# Patient Record
Sex: FEMALE | Race: WHITE | NOT HISPANIC OR LATINO | URBAN - METROPOLITAN AREA
[De-identification: names, ages, dates, MRNs, and addresses within clinical notes are randomized per-mention and may not be internally consistent; named-entity substitution may affect disease eponyms.]

---

## 2016-12-13 RX ORDER — FLUOXETINE HCL 10 MG
1 CAPSULE ORAL
Qty: 30 | Refills: 0 | COMMUNITY
Start: 2016-12-13 | End: 2017-01-12

## 2017-01-03 ENCOUNTER — OUTPATIENT (OUTPATIENT)
Dept: OUTPATIENT SERVICES | Facility: HOSPITAL | Age: 72
LOS: 1 days | End: 2017-01-03
Payer: SELF-PAY

## 2017-01-03 ENCOUNTER — APPOINTMENT (OUTPATIENT)
Age: 72
End: 2017-01-03

## 2017-01-03 VITALS
BODY MASS INDEX: 23.86 KG/M2 | WEIGHT: 152 LBS | DIASTOLIC BLOOD PRESSURE: 80 MMHG | HEIGHT: 67 IN | SYSTOLIC BLOOD PRESSURE: 126 MMHG

## 2017-01-03 DIAGNOSIS — R41.82 ALTERED MENTAL STATUS, UNSPECIFIED: ICD-10-CM

## 2017-01-03 DIAGNOSIS — I10 ESSENTIAL (PRIMARY) HYPERTENSION: ICD-10-CM

## 2017-01-03 DIAGNOSIS — I48.91 UNSPECIFIED ATRIAL FIBRILLATION: ICD-10-CM

## 2017-01-03 LAB
INR PPP: 2 RATIO
POCT-PROTHROMBIN TIME: 24.6 SECS

## 2017-01-03 PROCEDURE — G0463: CPT

## 2017-01-04 ENCOUNTER — NON-APPOINTMENT (OUTPATIENT)
Age: 72
End: 2017-01-04

## 2017-01-04 ENCOUNTER — APPOINTMENT (OUTPATIENT)
Dept: CARDIOLOGY | Facility: HOSPITAL | Age: 72
End: 2017-01-04

## 2017-01-04 ENCOUNTER — OUTPATIENT (OUTPATIENT)
Dept: OUTPATIENT SERVICES | Facility: HOSPITAL | Age: 72
LOS: 1 days | End: 2017-01-04
Payer: SELF-PAY

## 2017-01-04 VITALS
SYSTOLIC BLOOD PRESSURE: 92 MMHG | OXYGEN SATURATION: 96 % | WEIGHT: 151 LBS | HEART RATE: 107 BPM | HEIGHT: 67 IN | BODY MASS INDEX: 23.7 KG/M2 | DIASTOLIC BLOOD PRESSURE: 62 MMHG

## 2017-01-04 DIAGNOSIS — I63.9 CEREBRAL INFARCTION, UNSPECIFIED: ICD-10-CM

## 2017-01-04 DIAGNOSIS — G89.29 OTHER CHRONIC PAIN: ICD-10-CM

## 2017-01-04 DIAGNOSIS — I25.10 ATHEROSCLEROTIC HEART DISEASE OF NATIVE CORONARY ARTERY WITHOUT ANGINA PECTORIS: ICD-10-CM

## 2017-01-04 DIAGNOSIS — M54.6 PAIN IN THORACIC SPINE: ICD-10-CM

## 2017-01-04 DIAGNOSIS — R05 COUGH: ICD-10-CM

## 2017-01-04 PROCEDURE — G0463: CPT

## 2017-01-05 ENCOUNTER — APPOINTMENT (OUTPATIENT)
Dept: INTERNAL MEDICINE | Facility: CLINIC | Age: 72
End: 2017-01-05

## 2017-01-05 DIAGNOSIS — I48.91 UNSPECIFIED ATRIAL FIBRILLATION: ICD-10-CM

## 2017-01-10 ENCOUNTER — LABORATORY RESULT (OUTPATIENT)
Age: 72
End: 2017-01-10

## 2017-01-11 ENCOUNTER — APPOINTMENT (OUTPATIENT)
Dept: INTERNAL MEDICINE | Facility: CLINIC | Age: 72
End: 2017-01-11

## 2017-01-11 ENCOUNTER — OUTPATIENT (OUTPATIENT)
Dept: OUTPATIENT SERVICES | Facility: HOSPITAL | Age: 72
LOS: 1 days | End: 2017-01-11
Payer: SELF-PAY

## 2017-01-11 VITALS
HEART RATE: 106 BPM | DIASTOLIC BLOOD PRESSURE: 70 MMHG | HEIGHT: 67 IN | OXYGEN SATURATION: 98 % | SYSTOLIC BLOOD PRESSURE: 100 MMHG

## 2017-01-11 DIAGNOSIS — I63.9 CEREBRAL INFARCTION, UNSPECIFIED: ICD-10-CM

## 2017-01-11 DIAGNOSIS — M54.6 PAIN IN THORACIC SPINE: ICD-10-CM

## 2017-01-11 DIAGNOSIS — I10 ESSENTIAL (PRIMARY) HYPERTENSION: ICD-10-CM

## 2017-01-11 DIAGNOSIS — R05 COUGH: ICD-10-CM

## 2017-01-11 DIAGNOSIS — G89.29 OTHER CHRONIC PAIN: ICD-10-CM

## 2017-01-11 LAB
INR PPP: 4.2 RATIO
POCT-PROTHROMBIN TIME: 49.8 SECS
QUALITY CONTROL: YES

## 2017-01-11 PROCEDURE — G0463: CPT

## 2017-01-11 PROCEDURE — 85610 PROTHROMBIN TIME: CPT

## 2017-01-12 DIAGNOSIS — I48.91 UNSPECIFIED ATRIAL FIBRILLATION: ICD-10-CM

## 2017-01-13 LAB
ALBUMIN SERPL ELPH-MCNC: 4.1 G/DL
ALP BLD-CCNC: 73 U/L
ALT SERPL-CCNC: 21 U/L
ANION GAP SERPL CALC-SCNC: 13 MMOL/L
AST SERPL-CCNC: 17 U/L
BASOPHILS # BLD AUTO: 0.03 K/UL
BASOPHILS NFR BLD AUTO: 0.4 %
BILIRUB SERPL-MCNC: 0.6 MG/DL
BUN SERPL-MCNC: 12 MG/DL
CALCIUM SERPL-MCNC: 9.1 MG/DL
CHLORIDE SERPL-SCNC: 104 MMOL/L
CO2 SERPL-SCNC: 25 MMOL/L
CREAT SERPL-MCNC: 0.58 MG/DL
EOSINOPHIL # BLD AUTO: 0.22 K/UL
EOSINOPHIL NFR BLD AUTO: 2.7 %
GLUCOSE SERPL-MCNC: 102 MG/DL
HCT VFR BLD CALC: 44.7 %
HGB BLD-MCNC: 14 G/DL
IMM GRANULOCYTES NFR BLD AUTO: 0.2 %
LYMPHOCYTES # BLD AUTO: 1.34 K/UL
LYMPHOCYTES NFR BLD AUTO: 16.2 %
MAN DIFF?: NORMAL
MCHC RBC-ENTMCNC: 28.1 PG
MCHC RBC-ENTMCNC: 31.3 GM/DL
MCV RBC AUTO: 89.6 FL
MONOCYTES # BLD AUTO: 0.63 K/UL
MONOCYTES NFR BLD AUTO: 7.6 %
NEUTROPHILS # BLD AUTO: 6.01 K/UL
NEUTROPHILS NFR BLD AUTO: 72.9 %
PLATELET # BLD AUTO: 313 K/UL
POTASSIUM SERPL-SCNC: 4.2 MMOL/L
PROT SERPL-MCNC: 6.9 G/DL
RBC # BLD: 4.99 M/UL
RBC # FLD: 14.2 %
SODIUM SERPL-SCNC: 142 MMOL/L
TSH SERPL-ACNC: 5.57 UIU/ML
WBC # FLD AUTO: 8.25 K/UL

## 2017-01-17 ENCOUNTER — APPOINTMENT (OUTPATIENT)
Dept: INTERNAL MEDICINE | Facility: CLINIC | Age: 72
End: 2017-01-17

## 2017-01-17 ENCOUNTER — RESULT CHARGE (OUTPATIENT)
Age: 72
End: 2017-01-17

## 2017-01-17 LAB
INR PPP: 2 RATIO
POCT-PROTHROMBIN TIME: 23.6 SECS
QUALITY CONTROL: YES

## 2017-01-19 ENCOUNTER — OUTPATIENT (OUTPATIENT)
Dept: OUTPATIENT SERVICES | Facility: HOSPITAL | Age: 72
LOS: 1 days | End: 2017-01-19
Payer: SELF-PAY

## 2017-01-19 ENCOUNTER — APPOINTMENT (OUTPATIENT)
Dept: CV DIAGNOSITCS | Facility: HOSPITAL | Age: 72
End: 2017-01-19

## 2017-01-19 VITALS
HEIGHT: 65 IN | RESPIRATION RATE: 16 BRPM | SYSTOLIC BLOOD PRESSURE: 131 MMHG | DIASTOLIC BLOOD PRESSURE: 96 MMHG | HEART RATE: 122 BPM | WEIGHT: 149.91 LBS | OXYGEN SATURATION: 95 % | TEMPERATURE: 98 F

## 2017-01-19 DIAGNOSIS — Z86.79 PERSONAL HISTORY OF OTHER DISEASES OF THE CIRCULATORY SYSTEM: Chronic | ICD-10-CM

## 2017-01-19 DIAGNOSIS — Z90.49 ACQUIRED ABSENCE OF OTHER SPECIFIED PARTS OF DIGESTIVE TRACT: Chronic | ICD-10-CM

## 2017-01-19 DIAGNOSIS — I48.91 UNSPECIFIED ATRIAL FIBRILLATION: ICD-10-CM

## 2017-01-19 LAB
ANION GAP SERPL CALC-SCNC: 14 MMOL/L — SIGNIFICANT CHANGE UP (ref 5–17)
APTT BLD: 37 SEC — SIGNIFICANT CHANGE UP (ref 27.5–37.4)
BUN SERPL-MCNC: 11 MG/DL — SIGNIFICANT CHANGE UP (ref 7–23)
CALCIUM SERPL-MCNC: 9.2 MG/DL — SIGNIFICANT CHANGE UP (ref 8.4–10.5)
CHLORIDE SERPL-SCNC: 101 MMOL/L — SIGNIFICANT CHANGE UP (ref 96–108)
CO2 SERPL-SCNC: 24 MMOL/L — SIGNIFICANT CHANGE UP (ref 22–31)
CREAT SERPL-MCNC: 0.62 MG/DL — SIGNIFICANT CHANGE UP (ref 0.5–1.3)
GLUCOSE SERPL-MCNC: 106 MG/DL — HIGH (ref 70–99)
HCT VFR BLD CALC: 43 % — SIGNIFICANT CHANGE UP (ref 34.5–45)
HGB BLD-MCNC: 14 G/DL — SIGNIFICANT CHANGE UP (ref 11.5–15.5)
INR BLD: 2 RATIO — HIGH (ref 0.88–1.16)
MCHC RBC-ENTMCNC: 28.6 PG — SIGNIFICANT CHANGE UP (ref 27–34)
MCHC RBC-ENTMCNC: 32.4 GM/DL — SIGNIFICANT CHANGE UP (ref 32–36)
MCV RBC AUTO: 88.1 FL — SIGNIFICANT CHANGE UP (ref 80–100)
PLATELET # BLD AUTO: 262 K/UL — SIGNIFICANT CHANGE UP (ref 150–400)
POTASSIUM SERPL-MCNC: 4.5 MMOL/L — SIGNIFICANT CHANGE UP (ref 3.5–5.3)
POTASSIUM SERPL-SCNC: 4.5 MMOL/L — SIGNIFICANT CHANGE UP (ref 3.5–5.3)
PROTHROM AB SERPL-ACNC: 22 SEC — HIGH (ref 10–13.1)
RBC # BLD: 4.89 M/UL — SIGNIFICANT CHANGE UP (ref 3.8–5.2)
RBC # FLD: 12.7 % — SIGNIFICANT CHANGE UP (ref 10.3–14.5)
SODIUM SERPL-SCNC: 139 MMOL/L — SIGNIFICANT CHANGE UP (ref 135–145)
WBC # BLD: 8.5 K/UL — SIGNIFICANT CHANGE UP (ref 3.8–10.5)
WBC # FLD AUTO: 8.5 K/UL — SIGNIFICANT CHANGE UP (ref 3.8–10.5)

## 2017-01-19 PROCEDURE — 93325 DOPPLER ECHO COLOR FLOW MAPG: CPT

## 2017-01-19 PROCEDURE — 93312 ECHO TRANSESOPHAGEAL: CPT

## 2017-01-19 PROCEDURE — 93010 ELECTROCARDIOGRAM REPORT: CPT | Mod: 77

## 2017-01-19 PROCEDURE — 85610 PROTHROMBIN TIME: CPT

## 2017-01-19 PROCEDURE — 93312 ECHO TRANSESOPHAGEAL: CPT | Mod: 26

## 2017-01-19 PROCEDURE — 93010 ELECTROCARDIOGRAM REPORT: CPT

## 2017-01-19 PROCEDURE — 93325 DOPPLER ECHO COLOR FLOW MAPG: CPT | Mod: 26

## 2017-01-19 PROCEDURE — 93320 DOPPLER ECHO COMPLETE: CPT

## 2017-01-19 PROCEDURE — 93320 DOPPLER ECHO COMPLETE: CPT | Mod: 26

## 2017-01-19 PROCEDURE — 93005 ELECTROCARDIOGRAM TRACING: CPT

## 2017-01-19 PROCEDURE — 85730 THROMBOPLASTIN TIME PARTIAL: CPT

## 2017-01-19 PROCEDURE — 80048 BASIC METABOLIC PNL TOTAL CA: CPT

## 2017-01-19 PROCEDURE — 85027 COMPLETE CBC AUTOMATED: CPT

## 2017-01-19 RX ORDER — SODIUM CHLORIDE 9 MG/ML
3 INJECTION INTRAMUSCULAR; INTRAVENOUS; SUBCUTANEOUS EVERY 8 HOURS
Qty: 0 | Refills: 0 | Status: DISCONTINUED | OUTPATIENT
Start: 2017-01-19 | End: 2017-02-03

## 2017-01-19 NOTE — H&P CARDIOLOGY - PMH
Afib    CVA (cerebral vascular accident)    HLD (hyperlipidemia)    Vision loss Afib    CVA (cerebral vascular accident)    HLD (hyperlipidemia)    PNA (pneumonia)    Vision loss

## 2017-01-19 NOTE — H&P CARDIOLOGY - HISTORY OF PRESENT ILLNESS
71 year old female with PMH of occipital CVA 11/2016  c/b right arm weakness, bilateral visual field deficits, HLD, A Fib and chronic midline thoracic back pain  presents today for RACIEL/cardioversion. 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 Patient reports she was found to be in A Fib when admitted for CVA in November. Treated with Warfarin, Amiodarione and CCB but still experiences palpitations associated with left chest discomfort (unrelated to activity). Evaluated by Dr Grey and cardioversion recommended. 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 Patient reports she was found to be in A Fib when admitted for CVA in November. Treated with Warfarin, Amiodarione and CCB but still experiences palpitations associated with left chest discomfort (unrelated to activity). Evaluated by Dr Grey and cardioversion recommended.     Patient (speaks good English) was  offered phone  but requested that her grandson translate as needed.

## 2017-01-24 ENCOUNTER — APPOINTMENT (OUTPATIENT)
Dept: INTERNAL MEDICINE | Facility: CLINIC | Age: 72
End: 2017-01-24

## 2017-01-24 ENCOUNTER — RESULT CHARGE (OUTPATIENT)
Age: 72
End: 2017-01-24

## 2017-01-24 LAB
INR PPP: 1.8 RATIO
POCT-PROTHROMBIN TIME: 21.8 SECS
QUALITY CONTROL: YES

## 2017-01-27 ENCOUNTER — APPOINTMENT (OUTPATIENT)
Dept: OPHTHALMOLOGY | Facility: CLINIC | Age: 72
End: 2017-01-27

## 2017-01-31 ENCOUNTER — OUTPATIENT (OUTPATIENT)
Dept: OUTPATIENT SERVICES | Facility: HOSPITAL | Age: 72
LOS: 1 days | End: 2017-01-31
Payer: SELF-PAY

## 2017-01-31 ENCOUNTER — APPOINTMENT (OUTPATIENT)
Dept: INTERNAL MEDICINE | Facility: CLINIC | Age: 72
End: 2017-01-31

## 2017-01-31 ENCOUNTER — NON-APPOINTMENT (OUTPATIENT)
Age: 72
End: 2017-01-31

## 2017-01-31 VITALS
HEIGHT: 67 IN | BODY MASS INDEX: 24.17 KG/M2 | HEART RATE: 68 BPM | SYSTOLIC BLOOD PRESSURE: 140 MMHG | DIASTOLIC BLOOD PRESSURE: 76 MMHG | WEIGHT: 154 LBS

## 2017-01-31 DIAGNOSIS — I10 ESSENTIAL (PRIMARY) HYPERTENSION: ICD-10-CM

## 2017-01-31 DIAGNOSIS — Z86.79 PERSONAL HISTORY OF OTHER DISEASES OF THE CIRCULATORY SYSTEM: Chronic | ICD-10-CM

## 2017-01-31 DIAGNOSIS — Z90.49 ACQUIRED ABSENCE OF OTHER SPECIFIED PARTS OF DIGESTIVE TRACT: Chronic | ICD-10-CM

## 2017-01-31 LAB
INR PPP: 1.6 RATIO
POCT-PROTHROMBIN TIME: 18.8 SECS
QUALITY CONTROL: YES

## 2017-01-31 PROCEDURE — 93005 ELECTROCARDIOGRAM TRACING: CPT

## 2017-01-31 PROCEDURE — G0463: CPT

## 2017-01-31 RX ORDER — WARFARIN 4 MG/1
4 TABLET ORAL DAILY
Qty: 14 | Refills: 0 | Status: DISCONTINUED | COMMUNITY
Start: 2017-01-03 | End: 2017-01-31

## 2017-02-01 ENCOUNTER — APPOINTMENT (OUTPATIENT)
Dept: OBGYN | Facility: CLINIC | Age: 72
End: 2017-02-01

## 2017-02-01 ENCOUNTER — APPOINTMENT (OUTPATIENT)
Dept: MAMMOGRAPHY | Facility: IMAGING CENTER | Age: 72
End: 2017-02-01

## 2017-02-01 DIAGNOSIS — R05 COUGH: ICD-10-CM

## 2017-02-01 DIAGNOSIS — I48.91 UNSPECIFIED ATRIAL FIBRILLATION: ICD-10-CM

## 2017-02-01 DIAGNOSIS — I63.9 CEREBRAL INFARCTION, UNSPECIFIED: ICD-10-CM

## 2017-02-01 DIAGNOSIS — Z00.00 ENCOUNTER FOR GENERAL ADULT MEDICAL EXAMINATION WITHOUT ABNORMAL FINDINGS: ICD-10-CM

## 2017-02-01 DIAGNOSIS — G89.29 OTHER CHRONIC PAIN: ICD-10-CM

## 2017-02-01 DIAGNOSIS — M54.6 PAIN IN THORACIC SPINE: ICD-10-CM

## 2017-02-03 ENCOUNTER — APPOINTMENT (OUTPATIENT)
Dept: OPHTHALMOLOGY | Facility: CLINIC | Age: 72
End: 2017-02-03

## 2017-02-07 ENCOUNTER — OUTPATIENT (OUTPATIENT)
Dept: OUTPATIENT SERVICES | Facility: HOSPITAL | Age: 72
LOS: 1 days | End: 2017-02-07
Payer: SELF-PAY

## 2017-02-07 ENCOUNTER — RESULT REVIEW (OUTPATIENT)
Age: 72
End: 2017-02-07

## 2017-02-07 ENCOUNTER — RESULT CHARGE (OUTPATIENT)
Age: 72
End: 2017-02-07

## 2017-02-07 ENCOUNTER — LABORATORY RESULT (OUTPATIENT)
Age: 72
End: 2017-02-07

## 2017-02-07 ENCOUNTER — APPOINTMENT (OUTPATIENT)
Dept: INTERNAL MEDICINE | Facility: CLINIC | Age: 72
End: 2017-02-07

## 2017-02-07 DIAGNOSIS — Z86.79 PERSONAL HISTORY OF OTHER DISEASES OF THE CIRCULATORY SYSTEM: Chronic | ICD-10-CM

## 2017-02-07 DIAGNOSIS — Z90.49 ACQUIRED ABSENCE OF OTHER SPECIFIED PARTS OF DIGESTIVE TRACT: Chronic | ICD-10-CM

## 2017-02-07 LAB
INR PPP: 1.6 RATIO
POCT-PROTHROMBIN TIME: 19.6 SECS
QUALITY CONTROL: YES

## 2017-02-07 PROCEDURE — 85610 PROTHROMBIN TIME: CPT

## 2017-02-08 ENCOUNTER — OUTPATIENT (OUTPATIENT)
Dept: OUTPATIENT SERVICES | Facility: HOSPITAL | Age: 72
LOS: 1 days | End: 2017-02-08
Payer: SELF-PAY

## 2017-02-08 ENCOUNTER — NON-APPOINTMENT (OUTPATIENT)
Age: 72
End: 2017-02-08

## 2017-02-08 ENCOUNTER — APPOINTMENT (OUTPATIENT)
Dept: CARDIOLOGY | Facility: HOSPITAL | Age: 72
End: 2017-02-08

## 2017-02-08 ENCOUNTER — APPOINTMENT (OUTPATIENT)
Dept: OBGYN | Facility: CLINIC | Age: 72
End: 2017-02-08

## 2017-02-08 VITALS
SYSTOLIC BLOOD PRESSURE: 120 MMHG | DIASTOLIC BLOOD PRESSURE: 86 MMHG | HEIGHT: 65 IN | OXYGEN SATURATION: 99 % | HEART RATE: 130 BPM

## 2017-02-08 VITALS — WEIGHT: 155 LBS | BODY MASS INDEX: 25.79 KG/M2 | DIASTOLIC BLOOD PRESSURE: 88 MMHG | SYSTOLIC BLOOD PRESSURE: 148 MMHG

## 2017-02-08 DIAGNOSIS — N76.0 ACUTE VAGINITIS: ICD-10-CM

## 2017-02-08 DIAGNOSIS — Z86.79 PERSONAL HISTORY OF OTHER DISEASES OF THE CIRCULATORY SYSTEM: ICD-10-CM

## 2017-02-08 DIAGNOSIS — Z90.49 ACQUIRED ABSENCE OF OTHER SPECIFIED PARTS OF DIGESTIVE TRACT: Chronic | ICD-10-CM

## 2017-02-08 DIAGNOSIS — Z86.79 PERSONAL HISTORY OF OTHER DISEASES OF THE CIRCULATORY SYSTEM: Chronic | ICD-10-CM

## 2017-02-08 DIAGNOSIS — R05 COUGH: ICD-10-CM

## 2017-02-08 DIAGNOSIS — Z00.00 ENCOUNTER FOR GENERAL ADULT MEDICAL EXAMINATION WITHOUT ABNORMAL FINDINGS: ICD-10-CM

## 2017-02-08 DIAGNOSIS — I48.91 UNSPECIFIED ATRIAL FIBRILLATION: ICD-10-CM

## 2017-02-08 DIAGNOSIS — G89.29 OTHER CHRONIC PAIN: ICD-10-CM

## 2017-02-08 DIAGNOSIS — I63.9 CEREBRAL INFARCTION, UNSPECIFIED: ICD-10-CM

## 2017-02-08 DIAGNOSIS — I25.10 ATHEROSCLEROTIC HEART DISEASE OF NATIVE CORONARY ARTERY WITHOUT ANGINA PECTORIS: ICD-10-CM

## 2017-02-08 DIAGNOSIS — Z79.01 LONG TERM (CURRENT) USE OF ANTICOAGULANTS: ICD-10-CM

## 2017-02-08 DIAGNOSIS — H35.30 UNSPECIFIED MACULAR DEGENERATION: ICD-10-CM

## 2017-02-08 DIAGNOSIS — M54.6 PAIN IN THORACIC SPINE: ICD-10-CM

## 2017-02-08 PROCEDURE — 93005 ELECTROCARDIOGRAM TRACING: CPT

## 2017-02-08 PROCEDURE — G0439: CPT

## 2017-02-08 PROCEDURE — G0463: CPT

## 2017-02-08 PROCEDURE — 87624 HPV HI-RISK TYP POOLED RSLT: CPT

## 2017-02-08 PROCEDURE — G0463: CPT | Mod: 25

## 2017-02-08 RX ORDER — DIGOXIN 125 UG/1
125 TABLET ORAL
Qty: 15 | Refills: 2 | Status: DISCONTINUED | COMMUNITY
Start: 2017-02-08 | End: 2017-02-08

## 2017-02-14 ENCOUNTER — APPOINTMENT (OUTPATIENT)
Dept: INTERNAL MEDICINE | Facility: CLINIC | Age: 72
End: 2017-02-14

## 2017-02-14 ENCOUNTER — RESULT CHARGE (OUTPATIENT)
Age: 72
End: 2017-02-14

## 2017-02-14 ENCOUNTER — OUTPATIENT (OUTPATIENT)
Dept: OUTPATIENT SERVICES | Facility: HOSPITAL | Age: 72
LOS: 1 days | End: 2017-02-14
Payer: SELF-PAY

## 2017-02-14 ENCOUNTER — APPOINTMENT (OUTPATIENT)
Dept: CARDIOLOGY | Facility: HOSPITAL | Age: 72
End: 2017-02-14

## 2017-02-14 VITALS
SYSTOLIC BLOOD PRESSURE: 140 MMHG | BODY MASS INDEX: 25.79 KG/M2 | DIASTOLIC BLOOD PRESSURE: 90 MMHG | WEIGHT: 155 LBS | OXYGEN SATURATION: 99 % | HEART RATE: 126 BPM | RESPIRATION RATE: 14 BRPM

## 2017-02-14 VITALS
HEART RATE: 109 BPM | BODY MASS INDEX: 25.99 KG/M2 | OXYGEN SATURATION: 96 % | HEIGHT: 65 IN | DIASTOLIC BLOOD PRESSURE: 88 MMHG | SYSTOLIC BLOOD PRESSURE: 143 MMHG | WEIGHT: 156 LBS

## 2017-02-14 DIAGNOSIS — Z90.49 ACQUIRED ABSENCE OF OTHER SPECIFIED PARTS OF DIGESTIVE TRACT: Chronic | ICD-10-CM

## 2017-02-14 DIAGNOSIS — I25.10 ATHEROSCLEROTIC HEART DISEASE OF NATIVE CORONARY ARTERY WITHOUT ANGINA PECTORIS: ICD-10-CM

## 2017-02-14 DIAGNOSIS — Z86.79 PERSONAL HISTORY OF OTHER DISEASES OF THE CIRCULATORY SYSTEM: Chronic | ICD-10-CM

## 2017-02-14 PROCEDURE — 93005 ELECTROCARDIOGRAM TRACING: CPT

## 2017-02-14 PROCEDURE — G0463: CPT

## 2017-02-14 RX ORDER — METOPROLOL TARTRATE 25 MG/1
25 TABLET, FILM COATED ORAL TWICE DAILY
Qty: 60 | Refills: 3 | Status: DISCONTINUED | COMMUNITY
Start: 2017-02-08 | End: 2017-02-14

## 2017-02-15 ENCOUNTER — CHART COPY (OUTPATIENT)
Age: 72
End: 2017-02-15

## 2017-02-15 LAB
INR PPP: 1.7 RATIO
POCT-PROTHROMBIN TIME: 20.1 SECS
QUALITY CONTROL: YES

## 2017-02-15 RX ORDER — AMIODARONE HYDROCHLORIDE 400 MG/1
1 TABLET ORAL
Qty: 0 | Refills: 0 | COMMUNITY

## 2017-02-15 RX ORDER — WARFARIN SODIUM 2.5 MG/1
1 TABLET ORAL
Qty: 0 | Refills: 0 | COMMUNITY

## 2017-02-17 DIAGNOSIS — I48.91 UNSPECIFIED ATRIAL FIBRILLATION: ICD-10-CM

## 2017-02-17 DIAGNOSIS — Z01.419 ENCOUNTER FOR GYNECOLOGICAL EXAMINATION (GENERAL) (ROUTINE) WITHOUT ABNORMAL FINDINGS: ICD-10-CM

## 2017-02-17 DIAGNOSIS — R10.30 LOWER ABDOMINAL PAIN, UNSPECIFIED: ICD-10-CM

## 2017-02-22 ENCOUNTER — RESULT CHARGE (OUTPATIENT)
Age: 72
End: 2017-02-22

## 2017-02-22 ENCOUNTER — APPOINTMENT (OUTPATIENT)
Dept: OPHTHALMOLOGY | Facility: CLINIC | Age: 72
End: 2017-02-22

## 2017-02-22 ENCOUNTER — OUTPATIENT (OUTPATIENT)
Dept: OUTPATIENT SERVICES | Facility: HOSPITAL | Age: 72
LOS: 1 days | End: 2017-02-22
Payer: SELF-PAY

## 2017-02-22 ENCOUNTER — APPOINTMENT (OUTPATIENT)
Dept: INTERNAL MEDICINE | Facility: CLINIC | Age: 72
End: 2017-02-22

## 2017-02-22 DIAGNOSIS — Z86.79 PERSONAL HISTORY OF OTHER DISEASES OF THE CIRCULATORY SYSTEM: Chronic | ICD-10-CM

## 2017-02-22 DIAGNOSIS — Z90.49 ACQUIRED ABSENCE OF OTHER SPECIFIED PARTS OF DIGESTIVE TRACT: Chronic | ICD-10-CM

## 2017-02-22 LAB
INR PPP: 1.9 RATIO
POCT-PROTHROMBIN TIME: 23.3 SECS
QUALITY CONTROL: YES

## 2017-02-22 PROCEDURE — 85610 PROTHROMBIN TIME: CPT

## 2017-02-24 DIAGNOSIS — R05 COUGH: ICD-10-CM

## 2017-02-24 DIAGNOSIS — Z00.00 ENCOUNTER FOR GENERAL ADULT MEDICAL EXAMINATION WITHOUT ABNORMAL FINDINGS: ICD-10-CM

## 2017-02-24 DIAGNOSIS — I63.9 CEREBRAL INFARCTION, UNSPECIFIED: ICD-10-CM

## 2017-02-24 DIAGNOSIS — I48.91 UNSPECIFIED ATRIAL FIBRILLATION: ICD-10-CM

## 2017-02-24 DIAGNOSIS — M54.6 PAIN IN THORACIC SPINE: ICD-10-CM

## 2017-02-24 DIAGNOSIS — G89.29 OTHER CHRONIC PAIN: ICD-10-CM

## 2017-02-24 DIAGNOSIS — Z79.01 LONG TERM (CURRENT) USE OF ANTICOAGULANTS: ICD-10-CM

## 2017-02-27 ENCOUNTER — FORM ENCOUNTER (OUTPATIENT)
Age: 72
End: 2017-02-27

## 2017-02-28 ENCOUNTER — APPOINTMENT (OUTPATIENT)
Dept: MAMMOGRAPHY | Facility: IMAGING CENTER | Age: 72
End: 2017-02-28

## 2017-02-28 ENCOUNTER — APPOINTMENT (OUTPATIENT)
Dept: ULTRASOUND IMAGING | Facility: IMAGING CENTER | Age: 72
End: 2017-02-28

## 2017-02-28 ENCOUNTER — APPOINTMENT (OUTPATIENT)
Dept: INTERNAL MEDICINE | Facility: CLINIC | Age: 72
End: 2017-02-28

## 2017-02-28 ENCOUNTER — OUTPATIENT (OUTPATIENT)
Dept: OUTPATIENT SERVICES | Facility: HOSPITAL | Age: 72
LOS: 1 days | End: 2017-02-28
Payer: SELF-PAY

## 2017-02-28 ENCOUNTER — RESULT CHARGE (OUTPATIENT)
Age: 72
End: 2017-02-28

## 2017-02-28 ENCOUNTER — APPOINTMENT (OUTPATIENT)
Dept: RADIOLOGY | Facility: IMAGING CENTER | Age: 72
End: 2017-02-28

## 2017-02-28 DIAGNOSIS — Z86.79 PERSONAL HISTORY OF OTHER DISEASES OF THE CIRCULATORY SYSTEM: Chronic | ICD-10-CM

## 2017-02-28 DIAGNOSIS — Z90.49 ACQUIRED ABSENCE OF OTHER SPECIFIED PARTS OF DIGESTIVE TRACT: Chronic | ICD-10-CM

## 2017-02-28 DIAGNOSIS — Z79.01 LONG TERM (CURRENT) USE OF ANTICOAGULANTS: ICD-10-CM

## 2017-02-28 DIAGNOSIS — G89.29 OTHER CHRONIC PAIN: ICD-10-CM

## 2017-02-28 DIAGNOSIS — R05 COUGH: ICD-10-CM

## 2017-02-28 DIAGNOSIS — Z00.00 ENCOUNTER FOR GENERAL ADULT MEDICAL EXAMINATION WITHOUT ABNORMAL FINDINGS: ICD-10-CM

## 2017-02-28 DIAGNOSIS — I63.9 CEREBRAL INFARCTION, UNSPECIFIED: ICD-10-CM

## 2017-02-28 DIAGNOSIS — I48.91 UNSPECIFIED ATRIAL FIBRILLATION: ICD-10-CM

## 2017-02-28 DIAGNOSIS — M54.6 PAIN IN THORACIC SPINE: ICD-10-CM

## 2017-02-28 LAB
INR PPP: 2.4 RATIO
POCT-PROTHROMBIN TIME: 28.9 SECS
QUALITY CONTROL: YES

## 2017-02-28 PROCEDURE — 85610 PROTHROMBIN TIME: CPT

## 2017-02-28 PROCEDURE — 77067 SCR MAMMO BI INCL CAD: CPT

## 2017-02-28 PROCEDURE — 77080 DXA BONE DENSITY AXIAL: CPT

## 2017-02-28 PROCEDURE — 77063 BREAST TOMOSYNTHESIS BI: CPT

## 2017-02-28 PROCEDURE — 76830 TRANSVAGINAL US NON-OB: CPT

## 2017-02-28 PROCEDURE — 76856 US EXAM PELVIC COMPLETE: CPT

## 2017-03-03 DIAGNOSIS — Z12.31 ENCOUNTER FOR SCREENING MAMMOGRAM FOR MALIGNANT NEOPLASM OF BREAST: ICD-10-CM

## 2017-03-03 DIAGNOSIS — D25.9 LEIOMYOMA OF UTERUS, UNSPECIFIED: ICD-10-CM

## 2017-03-03 DIAGNOSIS — M85.89 OTHER SPECIFIED DISORDERS OF BONE DENSITY AND STRUCTURE, MULTIPLE SITES: ICD-10-CM

## 2017-03-03 DIAGNOSIS — Z78.0 ASYMPTOMATIC MENOPAUSAL STATE: ICD-10-CM

## 2017-03-07 ENCOUNTER — OUTPATIENT (OUTPATIENT)
Dept: OUTPATIENT SERVICES | Facility: HOSPITAL | Age: 72
LOS: 1 days | End: 2017-03-07
Payer: SELF-PAY

## 2017-03-07 ENCOUNTER — RESULT CHARGE (OUTPATIENT)
Age: 72
End: 2017-03-07

## 2017-03-07 ENCOUNTER — APPOINTMENT (OUTPATIENT)
Age: 72
End: 2017-03-07

## 2017-03-07 DIAGNOSIS — Z79.01 LONG TERM (CURRENT) USE OF ANTICOAGULANTS: ICD-10-CM

## 2017-03-07 DIAGNOSIS — Z90.49 ACQUIRED ABSENCE OF OTHER SPECIFIED PARTS OF DIGESTIVE TRACT: Chronic | ICD-10-CM

## 2017-03-07 DIAGNOSIS — Z86.79 PERSONAL HISTORY OF OTHER DISEASES OF THE CIRCULATORY SYSTEM: Chronic | ICD-10-CM

## 2017-03-07 DIAGNOSIS — I48.91 UNSPECIFIED ATRIAL FIBRILLATION: ICD-10-CM

## 2017-03-07 LAB
INR PPP: 2.5 RATIO
POCT-PROTHROMBIN TIME: 30.3 SECS
QUALITY CONTROL: YES

## 2017-03-07 PROCEDURE — 85610 PROTHROMBIN TIME: CPT

## 2017-03-10 ENCOUNTER — OUTPATIENT (OUTPATIENT)
Dept: OUTPATIENT SERVICES | Facility: HOSPITAL | Age: 72
LOS: 1 days | End: 2017-03-10
Payer: SELF-PAY

## 2017-03-10 ENCOUNTER — APPOINTMENT (OUTPATIENT)
Dept: INTERNAL MEDICINE | Facility: CLINIC | Age: 72
End: 2017-03-10

## 2017-03-10 VITALS
OXYGEN SATURATION: 98 % | WEIGHT: 158 LBS | DIASTOLIC BLOOD PRESSURE: 84 MMHG | SYSTOLIC BLOOD PRESSURE: 112 MMHG | HEART RATE: 96 BPM | BODY MASS INDEX: 26.33 KG/M2 | HEIGHT: 65 IN

## 2017-03-10 DIAGNOSIS — I10 ESSENTIAL (PRIMARY) HYPERTENSION: ICD-10-CM

## 2017-03-10 DIAGNOSIS — Z90.49 ACQUIRED ABSENCE OF OTHER SPECIFIED PARTS OF DIGESTIVE TRACT: Chronic | ICD-10-CM

## 2017-03-10 DIAGNOSIS — Z86.79 PERSONAL HISTORY OF OTHER DISEASES OF THE CIRCULATORY SYSTEM: Chronic | ICD-10-CM

## 2017-03-10 DIAGNOSIS — H54.7 UNSPECIFIED VISUAL LOSS: ICD-10-CM

## 2017-03-10 PROCEDURE — G0463: CPT

## 2017-03-10 RX ORDER — AMIODARONE HYDROCHLORIDE 200 MG/1
200 TABLET ORAL DAILY
Qty: 30 | Refills: 11 | Status: DISCONTINUED | COMMUNITY
Start: 2017-01-04 | End: 2017-03-10

## 2017-03-13 DIAGNOSIS — H54.7 UNSPECIFIED VISUAL LOSS: ICD-10-CM

## 2017-03-13 DIAGNOSIS — M54.6 PAIN IN THORACIC SPINE: ICD-10-CM

## 2017-03-13 DIAGNOSIS — R13.10 DYSPHAGIA, UNSPECIFIED: ICD-10-CM

## 2017-03-13 DIAGNOSIS — Z00.00 ENCOUNTER FOR GENERAL ADULT MEDICAL EXAMINATION WITHOUT ABNORMAL FINDINGS: ICD-10-CM

## 2017-03-13 DIAGNOSIS — G89.29 OTHER CHRONIC PAIN: ICD-10-CM

## 2017-03-13 DIAGNOSIS — I48.91 UNSPECIFIED ATRIAL FIBRILLATION: ICD-10-CM

## 2017-03-13 DIAGNOSIS — F45.8 OTHER SOMATOFORM DISORDERS: ICD-10-CM

## 2017-03-13 DIAGNOSIS — I63.9 CEREBRAL INFARCTION, UNSPECIFIED: ICD-10-CM

## 2017-03-15 ENCOUNTER — RESULT CHARGE (OUTPATIENT)
Age: 72
End: 2017-03-15

## 2017-03-15 ENCOUNTER — APPOINTMENT (OUTPATIENT)
Dept: INTERNAL MEDICINE | Facility: CLINIC | Age: 72
End: 2017-03-15

## 2017-03-15 LAB
INR PPP: 1.7 RATIO
POCT-PROTHROMBIN TIME: 20.8 SECS
QUALITY CONTROL: YES

## 2017-03-21 ENCOUNTER — RESULT CHARGE (OUTPATIENT)
Age: 72
End: 2017-03-21

## 2017-03-21 ENCOUNTER — APPOINTMENT (OUTPATIENT)
Dept: INTERNAL MEDICINE | Facility: CLINIC | Age: 72
End: 2017-03-21

## 2017-03-21 ENCOUNTER — APPOINTMENT (OUTPATIENT)
Age: 72
End: 2017-03-21

## 2017-03-22 ENCOUNTER — APPOINTMENT (OUTPATIENT)
Dept: CARDIOLOGY | Facility: HOSPITAL | Age: 72
End: 2017-03-22

## 2017-03-22 ENCOUNTER — OUTPATIENT (OUTPATIENT)
Dept: OUTPATIENT SERVICES | Facility: HOSPITAL | Age: 72
LOS: 1 days | End: 2017-03-22
Payer: SELF-PAY

## 2017-03-22 VITALS
SYSTOLIC BLOOD PRESSURE: 138 MMHG | HEIGHT: 65 IN | HEART RATE: 96 BPM | BODY MASS INDEX: 26.33 KG/M2 | OXYGEN SATURATION: 96 % | WEIGHT: 158 LBS | DIASTOLIC BLOOD PRESSURE: 87 MMHG

## 2017-03-22 DIAGNOSIS — I25.10 ATHEROSCLEROTIC HEART DISEASE OF NATIVE CORONARY ARTERY WITHOUT ANGINA PECTORIS: ICD-10-CM

## 2017-03-22 DIAGNOSIS — Z90.49 ACQUIRED ABSENCE OF OTHER SPECIFIED PARTS OF DIGESTIVE TRACT: Chronic | ICD-10-CM

## 2017-03-22 DIAGNOSIS — Z86.79 PERSONAL HISTORY OF OTHER DISEASES OF THE CIRCULATORY SYSTEM: Chronic | ICD-10-CM

## 2017-03-22 LAB
INR PPP: 1.9 RATIO
POCT-PROTHROMBIN TIME: 23.4 SECS
QUALITY CONTROL: YES

## 2017-03-22 PROCEDURE — G0463: CPT

## 2017-03-22 RX ORDER — DILTIAZEM HYDROCHLORIDE 180 MG/1
180 CAPSULE, EXTENDED RELEASE ORAL DAILY
Qty: 30 | Refills: 11 | Status: DISCONTINUED | COMMUNITY
Start: 2017-02-16 | End: 2017-03-22

## 2017-03-23 DIAGNOSIS — I48.91 UNSPECIFIED ATRIAL FIBRILLATION: ICD-10-CM

## 2017-03-28 ENCOUNTER — APPOINTMENT (OUTPATIENT)
Dept: INTERNAL MEDICINE | Facility: CLINIC | Age: 72
End: 2017-03-28

## 2017-03-28 ENCOUNTER — OUTPATIENT (OUTPATIENT)
Dept: OUTPATIENT SERVICES | Facility: HOSPITAL | Age: 72
LOS: 1 days | End: 2017-03-28
Payer: SELF-PAY

## 2017-03-28 DIAGNOSIS — F45.8 OTHER SOMATOFORM DISORDERS: ICD-10-CM

## 2017-03-28 DIAGNOSIS — R13.10 DYSPHAGIA, UNSPECIFIED: ICD-10-CM

## 2017-03-28 DIAGNOSIS — M54.6 PAIN IN THORACIC SPINE: ICD-10-CM

## 2017-03-28 DIAGNOSIS — Z79.01 LONG TERM (CURRENT) USE OF ANTICOAGULANTS: ICD-10-CM

## 2017-03-28 DIAGNOSIS — Z00.00 ENCOUNTER FOR GENERAL ADULT MEDICAL EXAMINATION WITHOUT ABNORMAL FINDINGS: ICD-10-CM

## 2017-03-28 DIAGNOSIS — I63.9 CEREBRAL INFARCTION, UNSPECIFIED: ICD-10-CM

## 2017-03-28 DIAGNOSIS — I48.91 UNSPECIFIED ATRIAL FIBRILLATION: ICD-10-CM

## 2017-03-28 DIAGNOSIS — G89.29 OTHER CHRONIC PAIN: ICD-10-CM

## 2017-03-28 DIAGNOSIS — Z86.79 PERSONAL HISTORY OF OTHER DISEASES OF THE CIRCULATORY SYSTEM: Chronic | ICD-10-CM

## 2017-03-28 DIAGNOSIS — H54.7 UNSPECIFIED VISUAL LOSS: ICD-10-CM

## 2017-03-28 DIAGNOSIS — Z90.49 ACQUIRED ABSENCE OF OTHER SPECIFIED PARTS OF DIGESTIVE TRACT: Chronic | ICD-10-CM

## 2017-03-28 LAB
INR PPP: 2.2 RATIO
POCT-PROTHROMBIN TIME: 26.6 SECS
QUALITY CONTROL: YES

## 2017-03-28 PROCEDURE — 85610 PROTHROMBIN TIME: CPT

## 2017-03-30 ENCOUNTER — APPOINTMENT (OUTPATIENT)
Dept: INTERNAL MEDICINE | Facility: CLINIC | Age: 72
End: 2017-03-30

## 2017-04-04 ENCOUNTER — APPOINTMENT (OUTPATIENT)
Dept: INTERNAL MEDICINE | Facility: CLINIC | Age: 72
End: 2017-04-04

## 2017-04-04 ENCOUNTER — OUTPATIENT (OUTPATIENT)
Dept: OUTPATIENT SERVICES | Facility: HOSPITAL | Age: 72
LOS: 1 days | End: 2017-04-04
Payer: SELF-PAY

## 2017-04-04 ENCOUNTER — RX RENEWAL (OUTPATIENT)
Age: 72
End: 2017-04-04

## 2017-04-04 ENCOUNTER — OTHER (OUTPATIENT)
Age: 72
End: 2017-04-04

## 2017-04-04 ENCOUNTER — RESULT CHARGE (OUTPATIENT)
Age: 72
End: 2017-04-04

## 2017-04-04 DIAGNOSIS — Z86.79 PERSONAL HISTORY OF OTHER DISEASES OF THE CIRCULATORY SYSTEM: Chronic | ICD-10-CM

## 2017-04-04 DIAGNOSIS — Z00.00 ENCOUNTER FOR GENERAL ADULT MEDICAL EXAMINATION WITHOUT ABNORMAL FINDINGS: ICD-10-CM

## 2017-04-04 DIAGNOSIS — I63.9 CEREBRAL INFARCTION, UNSPECIFIED: ICD-10-CM

## 2017-04-04 DIAGNOSIS — M54.6 PAIN IN THORACIC SPINE: ICD-10-CM

## 2017-04-04 DIAGNOSIS — G89.29 OTHER CHRONIC PAIN: ICD-10-CM

## 2017-04-04 DIAGNOSIS — Z79.01 LONG TERM (CURRENT) USE OF ANTICOAGULANTS: ICD-10-CM

## 2017-04-04 DIAGNOSIS — I48.91 UNSPECIFIED ATRIAL FIBRILLATION: ICD-10-CM

## 2017-04-04 DIAGNOSIS — H54.7 UNSPECIFIED VISUAL LOSS: ICD-10-CM

## 2017-04-04 DIAGNOSIS — F45.8 OTHER SOMATOFORM DISORDERS: ICD-10-CM

## 2017-04-04 DIAGNOSIS — Z90.49 ACQUIRED ABSENCE OF OTHER SPECIFIED PARTS OF DIGESTIVE TRACT: Chronic | ICD-10-CM

## 2017-04-04 DIAGNOSIS — R13.10 DYSPHAGIA, UNSPECIFIED: ICD-10-CM

## 2017-04-04 LAB
INR PPP: 2.2 RATIO
POCT-PROTHROMBIN TIME: 26.1 SECS
QUALITY CONTROL: YES

## 2017-04-04 PROCEDURE — 85610 PROTHROMBIN TIME: CPT

## 2017-04-05 LAB
CHOLEST SERPL-MCNC: 144 MG/DL
CHOLEST/HDLC SERPL: 1.7 RATIO
HDLC SERPL-MCNC: 87 MG/DL
LDLC SERPL CALC-MCNC: 47 MG/DL
TRIGL SERPL-MCNC: 50 MG/DL

## 2017-04-06 ENCOUNTER — OUTPATIENT (OUTPATIENT)
Dept: OUTPATIENT SERVICES | Facility: HOSPITAL | Age: 72
LOS: 1 days | End: 2017-04-06
Payer: SELF-PAY

## 2017-04-06 ENCOUNTER — APPOINTMENT (OUTPATIENT)
Dept: NEUROLOGY | Facility: HOSPITAL | Age: 72
End: 2017-04-06

## 2017-04-06 VITALS
HEIGHT: 65 IN | SYSTOLIC BLOOD PRESSURE: 137 MMHG | HEART RATE: 107 BPM | WEIGHT: 154 LBS | BODY MASS INDEX: 25.66 KG/M2 | DIASTOLIC BLOOD PRESSURE: 78 MMHG

## 2017-04-06 DIAGNOSIS — I48.91 UNSPECIFIED ATRIAL FIBRILLATION: ICD-10-CM

## 2017-04-06 DIAGNOSIS — I63.9 CEREBRAL INFARCTION, UNSPECIFIED: ICD-10-CM

## 2017-04-06 DIAGNOSIS — R56.9 UNSPECIFIED CONVULSIONS: ICD-10-CM

## 2017-04-06 DIAGNOSIS — Z86.79 PERSONAL HISTORY OF OTHER DISEASES OF THE CIRCULATORY SYSTEM: Chronic | ICD-10-CM

## 2017-04-06 DIAGNOSIS — Z90.49 ACQUIRED ABSENCE OF OTHER SPECIFIED PARTS OF DIGESTIVE TRACT: Chronic | ICD-10-CM

## 2017-04-06 PROCEDURE — G0463: CPT

## 2017-04-18 ENCOUNTER — APPOINTMENT (OUTPATIENT)
Dept: OPHTHALMOLOGY | Facility: CLINIC | Age: 72
End: 2017-04-18

## 2017-04-19 ENCOUNTER — OUTPATIENT (OUTPATIENT)
Dept: OUTPATIENT SERVICES | Facility: HOSPITAL | Age: 72
LOS: 1 days | End: 2017-04-19
Payer: SELF-PAY

## 2017-04-19 ENCOUNTER — APPOINTMENT (OUTPATIENT)
Dept: INTERNAL MEDICINE | Facility: CLINIC | Age: 72
End: 2017-04-19

## 2017-04-19 VITALS
WEIGHT: 160 LBS | DIASTOLIC BLOOD PRESSURE: 86 MMHG | OXYGEN SATURATION: 96 % | SYSTOLIC BLOOD PRESSURE: 136 MMHG | BODY MASS INDEX: 26.66 KG/M2 | HEART RATE: 85 BPM | HEIGHT: 65 IN

## 2017-04-19 DIAGNOSIS — Z90.49 ACQUIRED ABSENCE OF OTHER SPECIFIED PARTS OF DIGESTIVE TRACT: Chronic | ICD-10-CM

## 2017-04-19 DIAGNOSIS — I10 ESSENTIAL (PRIMARY) HYPERTENSION: ICD-10-CM

## 2017-04-19 DIAGNOSIS — Z86.79 PERSONAL HISTORY OF OTHER DISEASES OF THE CIRCULATORY SYSTEM: Chronic | ICD-10-CM

## 2017-04-19 PROCEDURE — G0463: CPT

## 2017-04-19 RX ORDER — CALCIUM CARBONATE/VITAMIN D3 500 MG-10
500-400 TABLET ORAL
Qty: 180 | Refills: 1 | Status: ACTIVE | COMMUNITY
Start: 2017-04-19 | End: 1900-01-01

## 2017-05-02 ENCOUNTER — OUTPATIENT (OUTPATIENT)
Dept: OUTPATIENT SERVICES | Facility: HOSPITAL | Age: 72
LOS: 1 days | End: 2017-05-02
Payer: SELF-PAY

## 2017-05-02 ENCOUNTER — APPOINTMENT (OUTPATIENT)
Dept: INTERNAL MEDICINE | Facility: CLINIC | Age: 72
End: 2017-05-02

## 2017-05-02 ENCOUNTER — RESULT CHARGE (OUTPATIENT)
Age: 72
End: 2017-05-02

## 2017-05-02 DIAGNOSIS — Z86.79 PERSONAL HISTORY OF OTHER DISEASES OF THE CIRCULATORY SYSTEM: Chronic | ICD-10-CM

## 2017-05-02 DIAGNOSIS — Z90.49 ACQUIRED ABSENCE OF OTHER SPECIFIED PARTS OF DIGESTIVE TRACT: Chronic | ICD-10-CM

## 2017-05-02 DIAGNOSIS — I10 ESSENTIAL (PRIMARY) HYPERTENSION: ICD-10-CM

## 2017-05-02 DIAGNOSIS — Z79.01 LONG TERM (CURRENT) USE OF ANTICOAGULANTS: ICD-10-CM

## 2017-05-02 LAB
INR PPP: 2.2 RATIO
POCT-PROTHROMBIN TIME: 26.1 SECS
QUALITY CONTROL: YES

## 2017-05-02 PROCEDURE — 85610 PROTHROMBIN TIME: CPT

## 2017-05-03 DIAGNOSIS — I63.9 CEREBRAL INFARCTION, UNSPECIFIED: ICD-10-CM

## 2017-05-03 DIAGNOSIS — I48.91 UNSPECIFIED ATRIAL FIBRILLATION: ICD-10-CM

## 2017-05-04 ENCOUNTER — APPOINTMENT (OUTPATIENT)
Dept: CARDIOLOGY | Facility: HOSPITAL | Age: 72
End: 2017-05-04

## 2017-05-04 ENCOUNTER — OUTPATIENT (OUTPATIENT)
Dept: OUTPATIENT SERVICES | Facility: HOSPITAL | Age: 72
LOS: 1 days | End: 2017-05-04
Payer: SELF-PAY

## 2017-05-04 VITALS
BODY MASS INDEX: 26.66 KG/M2 | HEART RATE: 97 BPM | WEIGHT: 160 LBS | OXYGEN SATURATION: 96 % | HEIGHT: 65 IN | DIASTOLIC BLOOD PRESSURE: 79 MMHG | SYSTOLIC BLOOD PRESSURE: 118 MMHG

## 2017-05-04 DIAGNOSIS — Z90.49 ACQUIRED ABSENCE OF OTHER SPECIFIED PARTS OF DIGESTIVE TRACT: Chronic | ICD-10-CM

## 2017-05-04 DIAGNOSIS — I25.10 ATHEROSCLEROTIC HEART DISEASE OF NATIVE CORONARY ARTERY WITHOUT ANGINA PECTORIS: ICD-10-CM

## 2017-05-04 DIAGNOSIS — Z86.79 PERSONAL HISTORY OF OTHER DISEASES OF THE CIRCULATORY SYSTEM: Chronic | ICD-10-CM

## 2017-05-04 PROCEDURE — G0463: CPT

## 2017-05-05 LAB
ALBUMIN SERPL ELPH-MCNC: 4.1 G/DL
ALP BLD-CCNC: 93 U/L
ALT SERPL-CCNC: 45 U/L
ANION GAP SERPL CALC-SCNC: 15 MMOL/L
AST SERPL-CCNC: 38 U/L
BASOPHILS # BLD AUTO: 0.03 K/UL
BASOPHILS NFR BLD AUTO: 0.4 %
BILIRUB SERPL-MCNC: 0.3 MG/DL
BUN SERPL-MCNC: 14 MG/DL
CALCIUM SERPL-MCNC: 8.8 MG/DL
CHLORIDE SERPL-SCNC: 103 MMOL/L
CO2 SERPL-SCNC: 25 MMOL/L
CREAT SERPL-MCNC: 0.58 MG/DL
EOSINOPHIL # BLD AUTO: 0.29 K/UL
EOSINOPHIL NFR BLD AUTO: 3.7 %
GLUCOSE SERPL-MCNC: 81 MG/DL
HCT VFR BLD CALC: 43.8 %
HGB BLD-MCNC: 13.8 G/DL
IMM GRANULOCYTES NFR BLD AUTO: 0.1 %
LYMPHOCYTES # BLD AUTO: 1.95 K/UL
LYMPHOCYTES NFR BLD AUTO: 25.1 %
MAN DIFF?: NORMAL
MCHC RBC-ENTMCNC: 27.7 PG
MCHC RBC-ENTMCNC: 31.5 GM/DL
MCV RBC AUTO: 87.8 FL
MONOCYTES # BLD AUTO: 0.74 K/UL
MONOCYTES NFR BLD AUTO: 9.5 %
NEUTROPHILS # BLD AUTO: 4.74 K/UL
NEUTROPHILS NFR BLD AUTO: 61.2 %
PLATELET # BLD AUTO: 320 K/UL
POTASSIUM SERPL-SCNC: 4.3 MMOL/L
PROT SERPL-MCNC: 7.3 G/DL
RBC # BLD: 4.99 M/UL
RBC # FLD: 14.8 %
SODIUM SERPL-SCNC: 143 MMOL/L
WBC # FLD AUTO: 7.76 K/UL

## 2017-05-11 ENCOUNTER — OUTPATIENT (OUTPATIENT)
Dept: OUTPATIENT SERVICES | Facility: HOSPITAL | Age: 72
LOS: 1 days | End: 2017-05-11
Payer: SELF-PAY

## 2017-05-11 ENCOUNTER — APPOINTMENT (OUTPATIENT)
Dept: CARDIOLOGY | Facility: HOSPITAL | Age: 72
End: 2017-05-11

## 2017-05-11 VITALS — DIASTOLIC BLOOD PRESSURE: 80 MMHG | HEART RATE: 112 BPM | SYSTOLIC BLOOD PRESSURE: 121 MMHG | OXYGEN SATURATION: 95 %

## 2017-05-11 DIAGNOSIS — Z86.79 PERSONAL HISTORY OF OTHER DISEASES OF THE CIRCULATORY SYSTEM: Chronic | ICD-10-CM

## 2017-05-11 DIAGNOSIS — I20.8 OTHER FORMS OF ANGINA PECTORIS: ICD-10-CM

## 2017-05-11 DIAGNOSIS — I25.10 ATHEROSCLEROTIC HEART DISEASE OF NATIVE CORONARY ARTERY WITHOUT ANGINA PECTORIS: ICD-10-CM

## 2017-05-11 DIAGNOSIS — Z90.49 ACQUIRED ABSENCE OF OTHER SPECIFIED PARTS OF DIGESTIVE TRACT: Chronic | ICD-10-CM

## 2017-05-11 PROCEDURE — 93005 ELECTROCARDIOGRAM TRACING: CPT

## 2017-05-11 PROCEDURE — G0463: CPT

## 2017-05-16 DIAGNOSIS — H54.7 UNSPECIFIED VISUAL LOSS: ICD-10-CM

## 2017-05-16 DIAGNOSIS — G89.29 OTHER CHRONIC PAIN: ICD-10-CM

## 2017-05-16 DIAGNOSIS — I48.91 UNSPECIFIED ATRIAL FIBRILLATION: ICD-10-CM

## 2017-05-16 DIAGNOSIS — R13.10 DYSPHAGIA, UNSPECIFIED: ICD-10-CM

## 2017-05-16 DIAGNOSIS — I63.9 CEREBRAL INFARCTION, UNSPECIFIED: ICD-10-CM

## 2017-05-16 DIAGNOSIS — R07.9 CHEST PAIN, UNSPECIFIED: ICD-10-CM

## 2017-05-16 DIAGNOSIS — I48.1 PERSISTENT ATRIAL FIBRILLATION: ICD-10-CM

## 2017-05-16 DIAGNOSIS — F45.8 OTHER SOMATOFORM DISORDERS: ICD-10-CM

## 2017-05-16 DIAGNOSIS — M54.6 PAIN IN THORACIC SPINE: ICD-10-CM

## 2017-05-16 DIAGNOSIS — I20.8 OTHER FORMS OF ANGINA PECTORIS: ICD-10-CM

## 2017-05-16 DIAGNOSIS — I10 ESSENTIAL (PRIMARY) HYPERTENSION: ICD-10-CM

## 2017-05-17 ENCOUNTER — RESULT CHARGE (OUTPATIENT)
Age: 72
End: 2017-05-17

## 2017-05-17 ENCOUNTER — APPOINTMENT (OUTPATIENT)
Dept: INTERNAL MEDICINE | Facility: CLINIC | Age: 72
End: 2017-05-17

## 2017-05-17 ENCOUNTER — OUTPATIENT (OUTPATIENT)
Dept: OUTPATIENT SERVICES | Facility: HOSPITAL | Age: 72
LOS: 1 days | End: 2017-05-17
Payer: SELF-PAY

## 2017-05-17 VITALS
BODY MASS INDEX: 26.82 KG/M2 | WEIGHT: 161 LBS | HEIGHT: 65 IN | HEART RATE: 96 BPM | OXYGEN SATURATION: 98 % | DIASTOLIC BLOOD PRESSURE: 82 MMHG | SYSTOLIC BLOOD PRESSURE: 120 MMHG

## 2017-05-17 VITALS — OXYGEN SATURATION: 97 %

## 2017-05-17 DIAGNOSIS — R05 COUGH: ICD-10-CM

## 2017-05-17 DIAGNOSIS — I48.1 PERSISTENT ATRIAL FIBRILLATION: ICD-10-CM

## 2017-05-17 DIAGNOSIS — M85.80 OTHER SPECIFIED DISORDERS OF BONE DENSITY AND STRUCTURE, UNSPECIFIED SITE: ICD-10-CM

## 2017-05-17 DIAGNOSIS — R13.10 DYSPHAGIA, UNSPECIFIED: ICD-10-CM

## 2017-05-17 DIAGNOSIS — Z90.49 ACQUIRED ABSENCE OF OTHER SPECIFIED PARTS OF DIGESTIVE TRACT: Chronic | ICD-10-CM

## 2017-05-17 DIAGNOSIS — R07.9 CHEST PAIN, UNSPECIFIED: ICD-10-CM

## 2017-05-17 DIAGNOSIS — I48.91 UNSPECIFIED ATRIAL FIBRILLATION: ICD-10-CM

## 2017-05-17 DIAGNOSIS — I10 ESSENTIAL (PRIMARY) HYPERTENSION: ICD-10-CM

## 2017-05-17 DIAGNOSIS — M54.2 CERVICALGIA: ICD-10-CM

## 2017-05-17 DIAGNOSIS — R33.9 RETENTION OF URINE, UNSPECIFIED: ICD-10-CM

## 2017-05-17 DIAGNOSIS — Z86.79 PERSONAL HISTORY OF OTHER DISEASES OF THE CIRCULATORY SYSTEM: Chronic | ICD-10-CM

## 2017-05-17 DIAGNOSIS — I63.9 CEREBRAL INFARCTION, UNSPECIFIED: ICD-10-CM

## 2017-05-17 DIAGNOSIS — Z79.01 LONG TERM (CURRENT) USE OF ANTICOAGULANTS: ICD-10-CM

## 2017-05-17 DIAGNOSIS — R06.02 SHORTNESS OF BREATH: ICD-10-CM

## 2017-05-17 DIAGNOSIS — R42 DIZZINESS AND GIDDINESS: ICD-10-CM

## 2017-05-17 LAB
INR PPP: 2.1 RATIO
POCT-PROTHROMBIN TIME: 24.7 SECS
QUALITY CONTROL: YES

## 2017-05-17 PROCEDURE — G0463: CPT

## 2017-05-18 ENCOUNTER — OUTPATIENT (OUTPATIENT)
Dept: OUTPATIENT SERVICES | Facility: HOSPITAL | Age: 72
LOS: 1 days | End: 2017-05-18
Payer: SELF-PAY

## 2017-05-18 ENCOUNTER — APPOINTMENT (OUTPATIENT)
Dept: CV DIAGNOSTICS | Facility: HOSPITAL | Age: 72
End: 2017-05-18

## 2017-05-18 DIAGNOSIS — I48.1 PERSISTENT ATRIAL FIBRILLATION: ICD-10-CM

## 2017-05-18 DIAGNOSIS — I10 ESSENTIAL (PRIMARY) HYPERTENSION: ICD-10-CM

## 2017-05-18 DIAGNOSIS — Z90.49 ACQUIRED ABSENCE OF OTHER SPECIFIED PARTS OF DIGESTIVE TRACT: Chronic | ICD-10-CM

## 2017-05-18 DIAGNOSIS — Z86.79 PERSONAL HISTORY OF OTHER DISEASES OF THE CIRCULATORY SYSTEM: Chronic | ICD-10-CM

## 2017-05-18 DIAGNOSIS — I20.8 OTHER FORMS OF ANGINA PECTORIS: ICD-10-CM

## 2017-05-18 DIAGNOSIS — R07.9 CHEST PAIN, UNSPECIFIED: ICD-10-CM

## 2017-05-18 PROCEDURE — 78452 HT MUSCLE IMAGE SPECT MULT: CPT | Mod: 26

## 2017-05-18 PROCEDURE — A9500: CPT

## 2017-05-18 PROCEDURE — 93017 CV STRESS TEST TRACING ONLY: CPT

## 2017-05-18 PROCEDURE — 93018 CV STRESS TEST I&R ONLY: CPT

## 2017-05-18 PROCEDURE — 93016 CV STRESS TEST SUPVJ ONLY: CPT

## 2017-05-18 PROCEDURE — 78452 HT MUSCLE IMAGE SPECT MULT: CPT

## 2017-05-31 ENCOUNTER — APPOINTMENT (OUTPATIENT)
Dept: INTERNAL MEDICINE | Facility: CLINIC | Age: 72
End: 2017-05-31

## 2017-05-31 ENCOUNTER — RESULT CHARGE (OUTPATIENT)
Age: 72
End: 2017-05-31

## 2017-05-31 LAB
INR PPP: 2 RATIO
POCT-PROTHROMBIN TIME: 23.7 SECS
QUALITY CONTROL: YES

## 2017-06-04 ENCOUNTER — FORM ENCOUNTER (OUTPATIENT)
Age: 72
End: 2017-06-04

## 2017-06-05 ENCOUNTER — APPOINTMENT (OUTPATIENT)
Dept: ULTRASOUND IMAGING | Facility: CLINIC | Age: 72
End: 2017-06-05

## 2017-06-05 ENCOUNTER — OUTPATIENT (OUTPATIENT)
Dept: OUTPATIENT SERVICES | Facility: HOSPITAL | Age: 72
LOS: 1 days | End: 2017-06-05
Payer: SELF-PAY

## 2017-06-05 ENCOUNTER — APPOINTMENT (OUTPATIENT)
Dept: MRI IMAGING | Facility: CLINIC | Age: 72
End: 2017-06-05

## 2017-06-05 DIAGNOSIS — Z00.8 ENCOUNTER FOR OTHER GENERAL EXAMINATION: ICD-10-CM

## 2017-06-05 DIAGNOSIS — Z86.79 PERSONAL HISTORY OF OTHER DISEASES OF THE CIRCULATORY SYSTEM: Chronic | ICD-10-CM

## 2017-06-05 DIAGNOSIS — M54.2 CERVICALGIA: ICD-10-CM

## 2017-06-05 DIAGNOSIS — Z90.49 ACQUIRED ABSENCE OF OTHER SPECIFIED PARTS OF DIGESTIVE TRACT: Chronic | ICD-10-CM

## 2017-06-05 DIAGNOSIS — I10 ESSENTIAL (PRIMARY) HYPERTENSION: ICD-10-CM

## 2017-06-05 DIAGNOSIS — R33.9 RETENTION OF URINE, UNSPECIFIED: ICD-10-CM

## 2017-06-05 PROCEDURE — 76775 US EXAM ABDO BACK WALL LIM: CPT

## 2017-06-05 PROCEDURE — 72141 MRI NECK SPINE W/O DYE: CPT

## 2017-06-05 PROCEDURE — 76770 US EXAM ABDO BACK WALL COMP: CPT

## 2017-06-05 PROCEDURE — 73721 MRI JNT OF LWR EXTRE W/O DYE: CPT

## 2017-06-06 ENCOUNTER — APPOINTMENT (OUTPATIENT)
Dept: SPEECH THERAPY | Facility: CLINIC | Age: 72
End: 2017-06-06

## 2017-06-06 ENCOUNTER — OUTPATIENT (OUTPATIENT)
Dept: OUTPATIENT SERVICES | Facility: HOSPITAL | Age: 72
LOS: 1 days | Discharge: HOME | End: 2017-06-06

## 2017-06-06 DIAGNOSIS — Z90.49 ACQUIRED ABSENCE OF OTHER SPECIFIED PARTS OF DIGESTIVE TRACT: Chronic | ICD-10-CM

## 2017-06-06 DIAGNOSIS — Z86.79 PERSONAL HISTORY OF OTHER DISEASES OF THE CIRCULATORY SYSTEM: Chronic | ICD-10-CM

## 2017-06-08 ENCOUNTER — APPOINTMENT (OUTPATIENT)
Dept: CARDIOLOGY | Facility: HOSPITAL | Age: 72
End: 2017-06-08

## 2017-06-08 ENCOUNTER — OUTPATIENT (OUTPATIENT)
Dept: OUTPATIENT SERVICES | Facility: HOSPITAL | Age: 72
LOS: 1 days | End: 2017-06-08
Payer: SELF-PAY

## 2017-06-08 VITALS
OXYGEN SATURATION: 96 % | HEART RATE: 95 BPM | SYSTOLIC BLOOD PRESSURE: 124 MMHG | HEIGHT: 65 IN | DIASTOLIC BLOOD PRESSURE: 75 MMHG

## 2017-06-08 DIAGNOSIS — Z90.49 ACQUIRED ABSENCE OF OTHER SPECIFIED PARTS OF DIGESTIVE TRACT: Chronic | ICD-10-CM

## 2017-06-08 DIAGNOSIS — I25.10 ATHEROSCLEROTIC HEART DISEASE OF NATIVE CORONARY ARTERY WITHOUT ANGINA PECTORIS: ICD-10-CM

## 2017-06-08 DIAGNOSIS — Z86.79 PERSONAL HISTORY OF OTHER DISEASES OF THE CIRCULATORY SYSTEM: Chronic | ICD-10-CM

## 2017-06-08 PROCEDURE — G0463: CPT

## 2017-06-09 DIAGNOSIS — R35.0 FREQUENCY OF MICTURITION: ICD-10-CM

## 2017-06-09 DIAGNOSIS — M54.2 CERVICALGIA: ICD-10-CM

## 2017-06-09 DIAGNOSIS — R30.0 DYSURIA: ICD-10-CM

## 2017-06-09 DIAGNOSIS — R39.14 FEELING OF INCOMPLETE BLADDER EMPTYING: ICD-10-CM

## 2017-06-10 ENCOUNTER — APPOINTMENT (OUTPATIENT)
Dept: MRI IMAGING | Facility: CLINIC | Age: 72
End: 2017-06-10

## 2017-06-10 ENCOUNTER — OUTPATIENT (OUTPATIENT)
Dept: OUTPATIENT SERVICES | Facility: HOSPITAL | Age: 72
LOS: 1 days | End: 2017-06-10
Payer: MEDICAID

## 2017-06-10 DIAGNOSIS — I63.9 CEREBRAL INFARCTION, UNSPECIFIED: ICD-10-CM

## 2017-06-10 DIAGNOSIS — Z90.49 ACQUIRED ABSENCE OF OTHER SPECIFIED PARTS OF DIGESTIVE TRACT: Chronic | ICD-10-CM

## 2017-06-10 DIAGNOSIS — Z86.79 PERSONAL HISTORY OF OTHER DISEASES OF THE CIRCULATORY SYSTEM: Chronic | ICD-10-CM

## 2017-06-10 PROCEDURE — 70551 MRI BRAIN STEM W/O DYE: CPT

## 2017-06-12 DIAGNOSIS — I48.91 UNSPECIFIED ATRIAL FIBRILLATION: ICD-10-CM

## 2017-06-12 DIAGNOSIS — R42 DIZZINESS AND GIDDINESS: ICD-10-CM

## 2017-06-12 DIAGNOSIS — M25.561 PAIN IN RIGHT KNEE: ICD-10-CM

## 2017-06-12 DIAGNOSIS — M54.2 CERVICALGIA: ICD-10-CM

## 2017-06-12 DIAGNOSIS — R05 COUGH: ICD-10-CM

## 2017-06-12 DIAGNOSIS — M85.80 OTHER SPECIFIED DISORDERS OF BONE DENSITY AND STRUCTURE, UNSPECIFIED SITE: ICD-10-CM

## 2017-06-12 DIAGNOSIS — R33.9 RETENTION OF URINE, UNSPECIFIED: ICD-10-CM

## 2017-06-12 DIAGNOSIS — I63.9 CEREBRAL INFARCTION, UNSPECIFIED: ICD-10-CM

## 2017-06-12 DIAGNOSIS — R13.10 DYSPHAGIA, UNSPECIFIED: ICD-10-CM

## 2017-06-12 DIAGNOSIS — R06.02 SHORTNESS OF BREATH: ICD-10-CM

## 2017-06-13 DIAGNOSIS — R13.10 DYSPHAGIA, UNSPECIFIED: ICD-10-CM

## 2017-06-15 ENCOUNTER — RX RENEWAL (OUTPATIENT)
Age: 72
End: 2017-06-15

## 2017-06-19 ENCOUNTER — OUTPATIENT (OUTPATIENT)
Dept: OUTPATIENT SERVICES | Facility: HOSPITAL | Age: 72
LOS: 1 days | End: 2017-06-19
Payer: SELF-PAY

## 2017-06-19 ENCOUNTER — NON-APPOINTMENT (OUTPATIENT)
Age: 72
End: 2017-06-19

## 2017-06-19 ENCOUNTER — APPOINTMENT (OUTPATIENT)
Dept: INTERNAL MEDICINE | Facility: CLINIC | Age: 72
End: 2017-06-19

## 2017-06-19 VITALS
OXYGEN SATURATION: 98 % | BODY MASS INDEX: 26.66 KG/M2 | HEART RATE: 96 BPM | HEIGHT: 65 IN | DIASTOLIC BLOOD PRESSURE: 88 MMHG | SYSTOLIC BLOOD PRESSURE: 150 MMHG | WEIGHT: 160 LBS

## 2017-06-19 DIAGNOSIS — I10 ESSENTIAL (PRIMARY) HYPERTENSION: ICD-10-CM

## 2017-06-19 DIAGNOSIS — R33.9 RETENTION OF URINE, UNSPECIFIED: ICD-10-CM

## 2017-06-19 DIAGNOSIS — R09.82 POSTNASAL DRIP: ICD-10-CM

## 2017-06-19 DIAGNOSIS — R06.02 SHORTNESS OF BREATH: ICD-10-CM

## 2017-06-19 DIAGNOSIS — Z86.79 PERSONAL HISTORY OF OTHER DISEASES OF THE CIRCULATORY SYSTEM: Chronic | ICD-10-CM

## 2017-06-19 DIAGNOSIS — Z90.49 ACQUIRED ABSENCE OF OTHER SPECIFIED PARTS OF DIGESTIVE TRACT: Chronic | ICD-10-CM

## 2017-06-19 LAB
INR PPP: 2.4 RATIO
POCT-PROTHROMBIN TIME: 28.2 SECS
QUALITY CONTROL: YES

## 2017-06-19 PROCEDURE — 85610 PROTHROMBIN TIME: CPT

## 2017-06-19 PROCEDURE — 90670 PCV13 VACCINE IM: CPT

## 2017-06-19 PROCEDURE — G0463: CPT

## 2017-06-19 PROCEDURE — G0009: CPT

## 2017-06-19 PROCEDURE — 93005 ELECTROCARDIOGRAM TRACING: CPT

## 2017-06-19 RX ORDER — DIGOXIN 125 UG/1
125 TABLET ORAL DAILY
Qty: 90 | Refills: 0 | Status: DISCONTINUED | COMMUNITY
Start: 2017-05-04 | End: 2017-06-19

## 2017-06-22 DIAGNOSIS — I48.91 UNSPECIFIED ATRIAL FIBRILLATION: ICD-10-CM

## 2017-06-22 DIAGNOSIS — R09.82 POSTNASAL DRIP: ICD-10-CM

## 2017-06-22 DIAGNOSIS — R06.02 SHORTNESS OF BREATH: ICD-10-CM

## 2017-06-22 DIAGNOSIS — D18.00 HEMANGIOMA UNSPECIFIED SITE: ICD-10-CM

## 2017-06-22 DIAGNOSIS — I63.9 CEREBRAL INFARCTION, UNSPECIFIED: ICD-10-CM

## 2017-06-22 DIAGNOSIS — M85.80 OTHER SPECIFIED DISORDERS OF BONE DENSITY AND STRUCTURE, UNSPECIFIED SITE: ICD-10-CM

## 2017-06-22 DIAGNOSIS — R33.9 RETENTION OF URINE, UNSPECIFIED: ICD-10-CM

## 2017-06-26 ENCOUNTER — FORM ENCOUNTER (OUTPATIENT)
Age: 72
End: 2017-06-26

## 2017-06-27 ENCOUNTER — APPOINTMENT (OUTPATIENT)
Dept: RADIOLOGY | Facility: HOSPITAL | Age: 72
End: 2017-06-27

## 2017-06-27 ENCOUNTER — OUTPATIENT (OUTPATIENT)
Dept: OUTPATIENT SERVICES | Facility: HOSPITAL | Age: 72
LOS: 1 days | End: 2017-06-27

## 2017-06-27 ENCOUNTER — APPOINTMENT (OUTPATIENT)
Dept: SPEECH THERAPY | Facility: HOSPITAL | Age: 72
End: 2017-06-27

## 2017-06-27 DIAGNOSIS — Z90.49 ACQUIRED ABSENCE OF OTHER SPECIFIED PARTS OF DIGESTIVE TRACT: Chronic | ICD-10-CM

## 2017-06-27 DIAGNOSIS — Z86.79 PERSONAL HISTORY OF OTHER DISEASES OF THE CIRCULATORY SYSTEM: Chronic | ICD-10-CM

## 2017-06-27 DIAGNOSIS — R13.10 DYSPHAGIA, UNSPECIFIED: ICD-10-CM

## 2017-06-28 ENCOUNTER — OUTPATIENT (OUTPATIENT)
Dept: OUTPATIENT SERVICES | Facility: HOSPITAL | Age: 72
LOS: 1 days | End: 2017-06-28
Payer: SELF-PAY

## 2017-06-28 ENCOUNTER — APPOINTMENT (OUTPATIENT)
Dept: CARDIOLOGY | Facility: HOSPITAL | Age: 72
End: 2017-06-28

## 2017-06-28 ENCOUNTER — NON-APPOINTMENT (OUTPATIENT)
Age: 72
End: 2017-06-28

## 2017-06-28 VITALS
SYSTOLIC BLOOD PRESSURE: 122 MMHG | DIASTOLIC BLOOD PRESSURE: 74 MMHG | OXYGEN SATURATION: 97 % | HEIGHT: 65 IN | HEART RATE: 98 BPM

## 2017-06-28 DIAGNOSIS — Z90.49 ACQUIRED ABSENCE OF OTHER SPECIFIED PARTS OF DIGESTIVE TRACT: Chronic | ICD-10-CM

## 2017-06-28 DIAGNOSIS — I25.10 ATHEROSCLEROTIC HEART DISEASE OF NATIVE CORONARY ARTERY WITHOUT ANGINA PECTORIS: ICD-10-CM

## 2017-06-28 DIAGNOSIS — Z86.79 PERSONAL HISTORY OF OTHER DISEASES OF THE CIRCULATORY SYSTEM: Chronic | ICD-10-CM

## 2017-06-28 PROCEDURE — G0463: CPT

## 2017-06-28 PROCEDURE — 93005 ELECTROCARDIOGRAM TRACING: CPT

## 2017-06-29 ENCOUNTER — APPOINTMENT (OUTPATIENT)
Dept: NEUROLOGY | Facility: HOSPITAL | Age: 72
End: 2017-06-29

## 2017-06-29 DIAGNOSIS — I48.91 UNSPECIFIED ATRIAL FIBRILLATION: ICD-10-CM

## 2017-07-03 DIAGNOSIS — R13.12 DYSPHAGIA, OROPHARYNGEAL PHASE: ICD-10-CM

## 2017-07-05 ENCOUNTER — APPOINTMENT (OUTPATIENT)
Dept: INTERNAL MEDICINE | Facility: CLINIC | Age: 72
End: 2017-07-05

## 2017-07-06 ENCOUNTER — RESULT CHARGE (OUTPATIENT)
Age: 72
End: 2017-07-06

## 2017-07-06 ENCOUNTER — APPOINTMENT (OUTPATIENT)
Dept: INTERNAL MEDICINE | Facility: CLINIC | Age: 72
End: 2017-07-06

## 2017-07-06 DIAGNOSIS — H54.7 UNSPECIFIED VISUAL LOSS: ICD-10-CM

## 2017-07-06 DIAGNOSIS — F45.8 OTHER SOMATOFORM DISORDERS: ICD-10-CM

## 2017-07-06 DIAGNOSIS — M54.6 PAIN IN THORACIC SPINE: ICD-10-CM

## 2017-07-06 DIAGNOSIS — Z00.00 ENCOUNTER FOR GENERAL ADULT MEDICAL EXAMINATION WITHOUT ABNORMAL FINDINGS: ICD-10-CM

## 2017-07-06 DIAGNOSIS — R13.10 DYSPHAGIA, UNSPECIFIED: ICD-10-CM

## 2017-07-06 DIAGNOSIS — G89.29 OTHER CHRONIC PAIN: ICD-10-CM

## 2017-07-06 DIAGNOSIS — I63.9 CEREBRAL INFARCTION, UNSPECIFIED: ICD-10-CM

## 2017-07-06 LAB
INR PPP: 1.7 RATIO
POCT-PROTHROMBIN TIME: 20.4 SECS
QUALITY CONTROL: YES

## 2017-07-13 ENCOUNTER — APPOINTMENT (OUTPATIENT)
Dept: INTERNAL MEDICINE | Facility: CLINIC | Age: 72
End: 2017-07-13

## 2017-07-13 ENCOUNTER — RESULT CHARGE (OUTPATIENT)
Age: 72
End: 2017-07-13

## 2017-07-13 ENCOUNTER — OUTPATIENT (OUTPATIENT)
Dept: OUTPATIENT SERVICES | Facility: HOSPITAL | Age: 72
LOS: 1 days | End: 2017-07-13
Payer: SELF-PAY

## 2017-07-13 DIAGNOSIS — I63.9 CEREBRAL INFARCTION, UNSPECIFIED: ICD-10-CM

## 2017-07-13 DIAGNOSIS — D18.00 HEMANGIOMA UNSPECIFIED SITE: ICD-10-CM

## 2017-07-13 DIAGNOSIS — R06.02 SHORTNESS OF BREATH: ICD-10-CM

## 2017-07-13 DIAGNOSIS — R09.82 POSTNASAL DRIP: ICD-10-CM

## 2017-07-13 DIAGNOSIS — I48.91 UNSPECIFIED ATRIAL FIBRILLATION: ICD-10-CM

## 2017-07-13 DIAGNOSIS — M85.80 OTHER SPECIFIED DISORDERS OF BONE DENSITY AND STRUCTURE, UNSPECIFIED SITE: ICD-10-CM

## 2017-07-13 DIAGNOSIS — Z79.01 LONG TERM (CURRENT) USE OF ANTICOAGULANTS: ICD-10-CM

## 2017-07-13 DIAGNOSIS — Z86.79 PERSONAL HISTORY OF OTHER DISEASES OF THE CIRCULATORY SYSTEM: Chronic | ICD-10-CM

## 2017-07-13 DIAGNOSIS — R33.9 RETENTION OF URINE, UNSPECIFIED: ICD-10-CM

## 2017-07-13 DIAGNOSIS — Z90.49 ACQUIRED ABSENCE OF OTHER SPECIFIED PARTS OF DIGESTIVE TRACT: Chronic | ICD-10-CM

## 2017-07-13 LAB
INR PPP: 1.7 RATIO
POCT-PROTHROMBIN TIME: 20.2 SECS
QUALITY CONTROL: YES

## 2017-07-13 PROCEDURE — 85610 PROTHROMBIN TIME: CPT

## 2017-07-19 ENCOUNTER — APPOINTMENT (OUTPATIENT)
Dept: PULMONOLOGY | Facility: CLINIC | Age: 72
End: 2017-07-19

## 2017-07-19 ENCOUNTER — OUTPATIENT (OUTPATIENT)
Dept: OUTPATIENT SERVICES | Facility: HOSPITAL | Age: 72
LOS: 1 days | End: 2017-07-19
Payer: SELF-PAY

## 2017-07-19 ENCOUNTER — APPOINTMENT (OUTPATIENT)
Dept: INTERNAL MEDICINE | Facility: CLINIC | Age: 72
End: 2017-07-19

## 2017-07-19 ENCOUNTER — RESULT CHARGE (OUTPATIENT)
Age: 72
End: 2017-07-19

## 2017-07-19 VITALS
BODY MASS INDEX: 24.99 KG/M2 | HEIGHT: 65 IN | WEIGHT: 150 LBS | HEART RATE: 92 BPM | OXYGEN SATURATION: 97 % | DIASTOLIC BLOOD PRESSURE: 70 MMHG | TEMPERATURE: 97.6 F | RESPIRATION RATE: 16 BRPM | SYSTOLIC BLOOD PRESSURE: 110 MMHG

## 2017-07-19 DIAGNOSIS — Z79.01 LONG TERM (CURRENT) USE OF ANTICOAGULANTS: ICD-10-CM

## 2017-07-19 DIAGNOSIS — R06.02 SHORTNESS OF BREATH: ICD-10-CM

## 2017-07-19 DIAGNOSIS — M85.80 OTHER SPECIFIED DISORDERS OF BONE DENSITY AND STRUCTURE, UNSPECIFIED SITE: ICD-10-CM

## 2017-07-19 DIAGNOSIS — I48.91 UNSPECIFIED ATRIAL FIBRILLATION: ICD-10-CM

## 2017-07-19 DIAGNOSIS — Z86.79 PERSONAL HISTORY OF OTHER DISEASES OF THE CIRCULATORY SYSTEM: Chronic | ICD-10-CM

## 2017-07-19 DIAGNOSIS — Z90.49 ACQUIRED ABSENCE OF OTHER SPECIFIED PARTS OF DIGESTIVE TRACT: Chronic | ICD-10-CM

## 2017-07-19 DIAGNOSIS — I63.9 CEREBRAL INFARCTION, UNSPECIFIED: ICD-10-CM

## 2017-07-19 DIAGNOSIS — D18.00 HEMANGIOMA UNSPECIFIED SITE: ICD-10-CM

## 2017-07-19 DIAGNOSIS — R33.9 RETENTION OF URINE, UNSPECIFIED: ICD-10-CM

## 2017-07-19 DIAGNOSIS — R09.82 POSTNASAL DRIP: ICD-10-CM

## 2017-07-19 LAB
INR PPP: 2.2 RATIO
POCT-PROTHROMBIN TIME: 25.9 SECS
QUALITY CONTROL: YES

## 2017-07-19 PROCEDURE — 85610 PROTHROMBIN TIME: CPT

## 2017-07-24 ENCOUNTER — APPOINTMENT (OUTPATIENT)
Dept: NEUROSURGERY | Facility: CLINIC | Age: 72
End: 2017-07-24

## 2017-07-24 ENCOUNTER — RESULT CHARGE (OUTPATIENT)
Age: 72
End: 2017-07-24

## 2017-07-24 ENCOUNTER — OUTPATIENT (OUTPATIENT)
Dept: OUTPATIENT SERVICES | Facility: HOSPITAL | Age: 72
LOS: 1 days | End: 2017-07-24
Payer: SELF-PAY

## 2017-07-24 ENCOUNTER — APPOINTMENT (OUTPATIENT)
Dept: INTERNAL MEDICINE | Facility: CLINIC | Age: 72
End: 2017-07-24

## 2017-07-24 VITALS
BODY MASS INDEX: 25.61 KG/M2 | OXYGEN SATURATION: 97 % | HEART RATE: 92 BPM | SYSTOLIC BLOOD PRESSURE: 127 MMHG | WEIGHT: 150 LBS | HEIGHT: 64 IN | DIASTOLIC BLOOD PRESSURE: 84 MMHG | TEMPERATURE: 98.3 F

## 2017-07-24 VITALS
OXYGEN SATURATION: 98 % | BODY MASS INDEX: 26.61 KG/M2 | HEART RATE: 96 BPM | SYSTOLIC BLOOD PRESSURE: 110 MMHG | WEIGHT: 155 LBS | DIASTOLIC BLOOD PRESSURE: 70 MMHG

## 2017-07-24 DIAGNOSIS — I10 ESSENTIAL (PRIMARY) HYPERTENSION: ICD-10-CM

## 2017-07-24 DIAGNOSIS — Z90.49 ACQUIRED ABSENCE OF OTHER SPECIFIED PARTS OF DIGESTIVE TRACT: Chronic | ICD-10-CM

## 2017-07-24 DIAGNOSIS — M54.2 CERVICALGIA: ICD-10-CM

## 2017-07-24 DIAGNOSIS — Z86.79 PERSONAL HISTORY OF OTHER DISEASES OF THE CIRCULATORY SYSTEM: Chronic | ICD-10-CM

## 2017-07-25 ENCOUNTER — LABORATORY RESULT (OUTPATIENT)
Age: 72
End: 2017-07-25

## 2017-07-25 DIAGNOSIS — I48.91 UNSPECIFIED ATRIAL FIBRILLATION: ICD-10-CM

## 2017-07-25 DIAGNOSIS — R13.10 DYSPHAGIA, UNSPECIFIED: ICD-10-CM

## 2017-07-25 DIAGNOSIS — I63.9 CEREBRAL INFARCTION, UNSPECIFIED: ICD-10-CM

## 2017-07-25 DIAGNOSIS — D18.00 HEMANGIOMA UNSPECIFIED SITE: ICD-10-CM

## 2017-07-25 PROBLEM — M54.2 NECK PAIN: Status: ACTIVE | Noted: 2017-05-17

## 2017-07-25 PROCEDURE — G0463: CPT

## 2017-07-25 PROCEDURE — 83036 HEMOGLOBIN GLYCOSYLATED A1C: CPT

## 2017-07-25 PROCEDURE — 85610 PROTHROMBIN TIME: CPT

## 2017-07-25 PROCEDURE — 82274 ASSAY TEST FOR BLOOD FECAL: CPT

## 2017-07-31 LAB — OB PNL STL IA: NEGATIVE — SIGNIFICANT CHANGE UP

## 2017-08-01 ENCOUNTER — APPOINTMENT (OUTPATIENT)
Dept: INTERNAL MEDICINE | Facility: CLINIC | Age: 72
End: 2017-08-01

## 2017-08-02 ENCOUNTER — RESULT CHARGE (OUTPATIENT)
Age: 72
End: 2017-08-02

## 2017-08-02 ENCOUNTER — OUTPATIENT (OUTPATIENT)
Dept: OUTPATIENT SERVICES | Facility: HOSPITAL | Age: 72
LOS: 1 days | End: 2017-08-02
Payer: SELF-PAY

## 2017-08-02 ENCOUNTER — APPOINTMENT (OUTPATIENT)
Dept: INTERNAL MEDICINE | Facility: CLINIC | Age: 72
End: 2017-08-02
Payer: COMMERCIAL

## 2017-08-02 DIAGNOSIS — D18.00 HEMANGIOMA UNSPECIFIED SITE: ICD-10-CM

## 2017-08-02 DIAGNOSIS — I48.91 UNSPECIFIED ATRIAL FIBRILLATION: ICD-10-CM

## 2017-08-02 DIAGNOSIS — R13.10 DYSPHAGIA, UNSPECIFIED: ICD-10-CM

## 2017-08-02 DIAGNOSIS — Z79.01 LONG TERM (CURRENT) USE OF ANTICOAGULANTS: ICD-10-CM

## 2017-08-02 DIAGNOSIS — Z86.79 PERSONAL HISTORY OF OTHER DISEASES OF THE CIRCULATORY SYSTEM: Chronic | ICD-10-CM

## 2017-08-02 DIAGNOSIS — I63.9 CEREBRAL INFARCTION, UNSPECIFIED: ICD-10-CM

## 2017-08-02 DIAGNOSIS — Z90.49 ACQUIRED ABSENCE OF OTHER SPECIFIED PARTS OF DIGESTIVE TRACT: Chronic | ICD-10-CM

## 2017-08-02 LAB
INR PPP: 2.5 RATIO
POCT-PROTHROMBIN TIME: 30.5 SECS
QUALITY CONTROL: YES

## 2017-08-02 PROCEDURE — 85610 PROTHROMBIN TIME: CPT

## 2017-08-02 PROCEDURE — ZZZZZ: CPT

## 2017-08-11 ENCOUNTER — APPOINTMENT (OUTPATIENT)
Dept: PULMONOLOGY | Facility: CLINIC | Age: 72
End: 2017-08-11
Payer: MEDICAID

## 2017-08-11 ENCOUNTER — RESULT CHARGE (OUTPATIENT)
Age: 72
End: 2017-08-11

## 2017-08-11 ENCOUNTER — OUTPATIENT (OUTPATIENT)
Dept: OUTPATIENT SERVICES | Facility: HOSPITAL | Age: 72
LOS: 1 days | End: 2017-08-11
Payer: SELF-PAY

## 2017-08-11 ENCOUNTER — APPOINTMENT (OUTPATIENT)
Dept: INTERNAL MEDICINE | Facility: CLINIC | Age: 72
End: 2017-08-11

## 2017-08-11 DIAGNOSIS — I63.9 CEREBRAL INFARCTION, UNSPECIFIED: ICD-10-CM

## 2017-08-11 DIAGNOSIS — Z79.01 LONG TERM (CURRENT) USE OF ANTICOAGULANTS: ICD-10-CM

## 2017-08-11 DIAGNOSIS — D18.00 HEMANGIOMA UNSPECIFIED SITE: ICD-10-CM

## 2017-08-11 DIAGNOSIS — R13.10 DYSPHAGIA, UNSPECIFIED: ICD-10-CM

## 2017-08-11 DIAGNOSIS — I48.91 UNSPECIFIED ATRIAL FIBRILLATION: ICD-10-CM

## 2017-08-11 DIAGNOSIS — Z86.79 PERSONAL HISTORY OF OTHER DISEASES OF THE CIRCULATORY SYSTEM: Chronic | ICD-10-CM

## 2017-08-11 DIAGNOSIS — Z90.49 ACQUIRED ABSENCE OF OTHER SPECIFIED PARTS OF DIGESTIVE TRACT: Chronic | ICD-10-CM

## 2017-08-11 LAB
INR PPP: 2.8 RATIO
POCT-PROTHROMBIN TIME: 33.6 SECS
QUALITY CONTROL: YES

## 2017-08-11 PROCEDURE — 94060 EVALUATION OF WHEEZING: CPT

## 2017-08-11 PROCEDURE — 85610 PROTHROMBIN TIME: CPT

## 2017-08-11 PROCEDURE — 94729 DIFFUSING CAPACITY: CPT

## 2017-08-11 PROCEDURE — 94726 PLETHYSMOGRAPHY LUNG VOLUMES: CPT

## 2017-08-25 ENCOUNTER — RESULT CHARGE (OUTPATIENT)
Age: 72
End: 2017-08-25

## 2017-08-25 ENCOUNTER — APPOINTMENT (OUTPATIENT)
Dept: INTERNAL MEDICINE | Facility: CLINIC | Age: 72
End: 2017-08-25

## 2017-08-25 LAB
INR PPP: 3.1 RATIO
POCT-PROTHROMBIN TIME: 37.1 SECS
QUALITY CONTROL: YES

## 2017-08-29 ENCOUNTER — APPOINTMENT (OUTPATIENT)
Dept: SLEEP CENTER | Facility: CLINIC | Age: 72
End: 2017-08-29
Payer: MEDICAID

## 2017-08-29 ENCOUNTER — OUTPATIENT (OUTPATIENT)
Dept: OUTPATIENT SERVICES | Facility: HOSPITAL | Age: 72
LOS: 1 days | End: 2017-08-29
Payer: MEDICAID

## 2017-08-29 DIAGNOSIS — Z90.49 ACQUIRED ABSENCE OF OTHER SPECIFIED PARTS OF DIGESTIVE TRACT: Chronic | ICD-10-CM

## 2017-08-29 DIAGNOSIS — Z86.79 PERSONAL HISTORY OF OTHER DISEASES OF THE CIRCULATORY SYSTEM: Chronic | ICD-10-CM

## 2017-08-29 PROCEDURE — 95810 POLYSOM 6/> YRS 4/> PARAM: CPT

## 2017-08-29 PROCEDURE — 95810 POLYSOM 6/> YRS 4/> PARAM: CPT | Mod: 26

## 2017-08-30 DIAGNOSIS — G47.33 OBSTRUCTIVE SLEEP APNEA (ADULT) (PEDIATRIC): ICD-10-CM

## 2017-09-01 ENCOUNTER — RESULT CHARGE (OUTPATIENT)
Age: 72
End: 2017-09-01

## 2017-09-01 ENCOUNTER — APPOINTMENT (OUTPATIENT)
Dept: INTERNAL MEDICINE | Facility: CLINIC | Age: 72
End: 2017-09-01

## 2017-09-01 ENCOUNTER — OUTPATIENT (OUTPATIENT)
Dept: OUTPATIENT SERVICES | Facility: HOSPITAL | Age: 72
LOS: 1 days | End: 2017-09-01
Payer: SELF-PAY

## 2017-09-01 DIAGNOSIS — R13.10 DYSPHAGIA, UNSPECIFIED: ICD-10-CM

## 2017-09-01 DIAGNOSIS — I48.91 UNSPECIFIED ATRIAL FIBRILLATION: ICD-10-CM

## 2017-09-01 DIAGNOSIS — I63.9 CEREBRAL INFARCTION, UNSPECIFIED: ICD-10-CM

## 2017-09-01 DIAGNOSIS — Z90.49 ACQUIRED ABSENCE OF OTHER SPECIFIED PARTS OF DIGESTIVE TRACT: Chronic | ICD-10-CM

## 2017-09-01 DIAGNOSIS — Z79.01 LONG TERM (CURRENT) USE OF ANTICOAGULANTS: ICD-10-CM

## 2017-09-01 DIAGNOSIS — Z86.79 PERSONAL HISTORY OF OTHER DISEASES OF THE CIRCULATORY SYSTEM: Chronic | ICD-10-CM

## 2017-09-01 DIAGNOSIS — D18.00 HEMANGIOMA UNSPECIFIED SITE: ICD-10-CM

## 2017-09-01 LAB
INR PPP: 2.9 RATIO
POCT-PROTHROMBIN TIME: 35.2 SECS
QUALITY CONTROL: YES

## 2017-09-01 PROCEDURE — 85610 PROTHROMBIN TIME: CPT

## 2017-09-13 ENCOUNTER — OUTPATIENT (OUTPATIENT)
Dept: OUTPATIENT SERVICES | Facility: HOSPITAL | Age: 72
LOS: 1 days | End: 2017-09-13
Payer: SELF-PAY

## 2017-09-13 ENCOUNTER — APPOINTMENT (OUTPATIENT)
Dept: INTERNAL MEDICINE | Facility: CLINIC | Age: 72
End: 2017-09-13

## 2017-09-13 ENCOUNTER — RESULT CHARGE (OUTPATIENT)
Age: 72
End: 2017-09-13

## 2017-09-13 ENCOUNTER — APPOINTMENT (OUTPATIENT)
Age: 72
End: 2017-09-13

## 2017-09-13 ENCOUNTER — APPOINTMENT (OUTPATIENT)
Dept: PULMONOLOGY | Facility: CLINIC | Age: 72
End: 2017-09-13
Payer: MEDICAID

## 2017-09-13 VITALS
OXYGEN SATURATION: 95 % | HEIGHT: 64 IN | HEART RATE: 99 BPM | BODY MASS INDEX: 26.63 KG/M2 | WEIGHT: 156 LBS | RESPIRATION RATE: 20 BRPM

## 2017-09-13 VITALS
WEIGHT: 156 LBS | TEMPERATURE: 97.6 F | HEIGHT: 64 IN | DIASTOLIC BLOOD PRESSURE: 60 MMHG | OXYGEN SATURATION: 97 % | BODY MASS INDEX: 26.63 KG/M2 | SYSTOLIC BLOOD PRESSURE: 100 MMHG | HEART RATE: 103 BPM | RESPIRATION RATE: 16 BRPM

## 2017-09-13 DIAGNOSIS — I10 ESSENTIAL (PRIMARY) HYPERTENSION: ICD-10-CM

## 2017-09-13 DIAGNOSIS — L29.9 PRURITUS, UNSPECIFIED: ICD-10-CM

## 2017-09-13 DIAGNOSIS — R06.02 SHORTNESS OF BREATH: ICD-10-CM

## 2017-09-13 DIAGNOSIS — Z86.79 PERSONAL HISTORY OF OTHER DISEASES OF THE CIRCULATORY SYSTEM: Chronic | ICD-10-CM

## 2017-09-13 DIAGNOSIS — Z90.49 ACQUIRED ABSENCE OF OTHER SPECIFIED PARTS OF DIGESTIVE TRACT: Chronic | ICD-10-CM

## 2017-09-13 LAB
INR PPP: 2.7 RATIO
POCT-PROTHROMBIN TIME: 32.6 SECS
QUALITY CONTROL: YES

## 2017-09-13 PROCEDURE — 99213 OFFICE O/P EST LOW 20 MIN: CPT | Mod: GC

## 2017-09-13 PROCEDURE — 85610 PROTHROMBIN TIME: CPT

## 2017-09-13 PROCEDURE — G0463: CPT

## 2017-09-15 ENCOUNTER — RESULT REVIEW (OUTPATIENT)
Age: 72
End: 2017-09-15

## 2017-09-25 DIAGNOSIS — Z79.01 LONG TERM (CURRENT) USE OF ANTICOAGULANTS: ICD-10-CM

## 2017-09-25 DIAGNOSIS — L29.9 PRURITUS, UNSPECIFIED: ICD-10-CM

## 2017-09-25 DIAGNOSIS — R42 DIZZINESS AND GIDDINESS: ICD-10-CM

## 2017-09-27 ENCOUNTER — OUTPATIENT (OUTPATIENT)
Dept: OUTPATIENT SERVICES | Facility: HOSPITAL | Age: 72
LOS: 1 days | End: 2017-09-27
Payer: SELF-PAY

## 2017-09-27 ENCOUNTER — APPOINTMENT (OUTPATIENT)
Dept: CARDIOLOGY | Facility: HOSPITAL | Age: 72
End: 2017-09-27
Payer: MEDICAID

## 2017-09-27 ENCOUNTER — RESULT CHARGE (OUTPATIENT)
Age: 72
End: 2017-09-27

## 2017-09-27 ENCOUNTER — APPOINTMENT (OUTPATIENT)
Dept: INTERNAL MEDICINE | Facility: CLINIC | Age: 72
End: 2017-09-27
Payer: MEDICAID

## 2017-09-27 ENCOUNTER — APPOINTMENT (OUTPATIENT)
Dept: CV DIAGNOSITCS | Facility: HOSPITAL | Age: 72
End: 2017-09-27

## 2017-09-27 VITALS
SYSTOLIC BLOOD PRESSURE: 131 MMHG | OXYGEN SATURATION: 97 % | DIASTOLIC BLOOD PRESSURE: 86 MMHG | HEART RATE: 97 BPM | HEIGHT: 64 IN

## 2017-09-27 DIAGNOSIS — I25.10 ATHEROSCLEROTIC HEART DISEASE OF NATIVE CORONARY ARTERY WITHOUT ANGINA PECTORIS: ICD-10-CM

## 2017-09-27 DIAGNOSIS — Z79.01 LONG TERM (CURRENT) USE OF ANTICOAGULANTS: ICD-10-CM

## 2017-09-27 DIAGNOSIS — L29.9 PRURITUS, UNSPECIFIED: ICD-10-CM

## 2017-09-27 DIAGNOSIS — Z86.79 PERSONAL HISTORY OF OTHER DISEASES OF THE CIRCULATORY SYSTEM: Chronic | ICD-10-CM

## 2017-09-27 DIAGNOSIS — Z90.49 ACQUIRED ABSENCE OF OTHER SPECIFIED PARTS OF DIGESTIVE TRACT: Chronic | ICD-10-CM

## 2017-09-27 DIAGNOSIS — R42 DIZZINESS AND GIDDINESS: ICD-10-CM

## 2017-09-27 LAB
ALBUMIN SERPL ELPH-MCNC: 4.3 G/DL — SIGNIFICANT CHANGE UP (ref 3.3–5)
ALP SERPL-CCNC: 91 U/L — SIGNIFICANT CHANGE UP (ref 40–120)
ALT FLD-CCNC: 46 U/L — HIGH (ref 10–45)
ANION GAP SERPL CALC-SCNC: 15 MMOL/L — SIGNIFICANT CHANGE UP (ref 5–17)
AST SERPL-CCNC: 29 U/L — SIGNIFICANT CHANGE UP (ref 10–40)
BASOPHILS # BLD AUTO: 0.01 K/UL — SIGNIFICANT CHANGE UP (ref 0–0.2)
BASOPHILS NFR BLD AUTO: 0.1 % — SIGNIFICANT CHANGE UP (ref 0–2)
BILIRUB SERPL-MCNC: 0.6 MG/DL — SIGNIFICANT CHANGE UP (ref 0.2–1.2)
BUN SERPL-MCNC: 15 MG/DL — SIGNIFICANT CHANGE UP (ref 7–23)
CALCIUM SERPL-MCNC: 9.3 MG/DL — SIGNIFICANT CHANGE UP (ref 8.4–10.5)
CHLORIDE SERPL-SCNC: 101 MMOL/L — SIGNIFICANT CHANGE UP (ref 96–108)
CO2 SERPL-SCNC: 27 MMOL/L — SIGNIFICANT CHANGE UP (ref 22–31)
CREAT SERPL-MCNC: 0.6 MG/DL — SIGNIFICANT CHANGE UP (ref 0.5–1.3)
EOSINOPHIL # BLD AUTO: 0.18 K/UL — SIGNIFICANT CHANGE UP (ref 0–0.5)
EOSINOPHIL NFR BLD AUTO: 2.5 % — SIGNIFICANT CHANGE UP (ref 0–6)
GLUCOSE SERPL-MCNC: 96 MG/DL — SIGNIFICANT CHANGE UP (ref 70–99)
HCT VFR BLD CALC: 43.2 % — SIGNIFICANT CHANGE UP (ref 34.5–45)
HGB BLD-MCNC: 13.7 G/DL — SIGNIFICANT CHANGE UP (ref 11.5–15.5)
IMM GRANULOCYTES NFR BLD AUTO: 0.1 % — SIGNIFICANT CHANGE UP (ref 0–1.5)
LYMPHOCYTES # BLD AUTO: 1.57 K/UL — SIGNIFICANT CHANGE UP (ref 1–3.3)
LYMPHOCYTES # BLD AUTO: 22 % — SIGNIFICANT CHANGE UP (ref 13–44)
MCHC RBC-ENTMCNC: 27.9 PG — SIGNIFICANT CHANGE UP (ref 27–34)
MCHC RBC-ENTMCNC: 31.7 GM/DL — LOW (ref 32–36)
MCV RBC AUTO: 88 FL — SIGNIFICANT CHANGE UP (ref 80–100)
MONOCYTES # BLD AUTO: 0.5 K/UL — SIGNIFICANT CHANGE UP (ref 0–0.9)
MONOCYTES NFR BLD AUTO: 7 % — SIGNIFICANT CHANGE UP (ref 2–14)
NEUTROPHILS # BLD AUTO: 4.87 K/UL — SIGNIFICANT CHANGE UP (ref 1.8–7.4)
NEUTROPHILS NFR BLD AUTO: 68.3 % — SIGNIFICANT CHANGE UP (ref 43–77)
PLATELET # BLD AUTO: 298 K/UL — SIGNIFICANT CHANGE UP (ref 150–400)
POTASSIUM SERPL-MCNC: 4.3 MMOL/L — SIGNIFICANT CHANGE UP (ref 3.5–5.3)
POTASSIUM SERPL-SCNC: 4.3 MMOL/L — SIGNIFICANT CHANGE UP (ref 3.5–5.3)
PROT SERPL-MCNC: 7.7 G/DL — SIGNIFICANT CHANGE UP (ref 6–8.3)
RBC # BLD: 4.91 M/UL — SIGNIFICANT CHANGE UP (ref 3.8–5.2)
RBC # FLD: 14.2 % — SIGNIFICANT CHANGE UP (ref 10.3–14.5)
SODIUM SERPL-SCNC: 143 MMOL/L — SIGNIFICANT CHANGE UP (ref 135–145)
WBC # BLD: 7.14 K/UL — SIGNIFICANT CHANGE UP (ref 3.8–10.5)
WBC # FLD AUTO: 7.14 K/UL — SIGNIFICANT CHANGE UP (ref 3.8–10.5)

## 2017-09-27 PROCEDURE — 93306 TTE W/DOPPLER COMPLETE: CPT | Mod: 26

## 2017-09-27 PROCEDURE — 85610 PROTHROMBIN TIME: CPT

## 2017-09-27 PROCEDURE — 85027 COMPLETE CBC AUTOMATED: CPT

## 2017-09-27 PROCEDURE — ZZZZZ: CPT

## 2017-09-27 PROCEDURE — 93306 TTE W/DOPPLER COMPLETE: CPT

## 2017-09-27 PROCEDURE — G0463: CPT

## 2017-09-27 PROCEDURE — 80053 COMPREHEN METABOLIC PANEL: CPT

## 2017-09-28 DIAGNOSIS — I48.91 UNSPECIFIED ATRIAL FIBRILLATION: ICD-10-CM

## 2017-09-28 LAB
INR PPP: 2.8 RATIO
POCT-PROTHROMBIN TIME: 33.5 SECS
QUALITY CONTROL: YES

## 2017-09-29 ENCOUNTER — APPOINTMENT (OUTPATIENT)
Dept: INTERNAL MEDICINE | Facility: CLINIC | Age: 72
End: 2017-09-29

## 2017-10-02 ENCOUNTER — APPOINTMENT (OUTPATIENT)
Dept: INTERNAL MEDICINE | Facility: CLINIC | Age: 72
End: 2017-10-02

## 2017-10-06 ENCOUNTER — APPOINTMENT (OUTPATIENT)
Dept: INTERNAL MEDICINE | Facility: CLINIC | Age: 72
End: 2017-10-06

## 2017-10-06 ENCOUNTER — OUTPATIENT (OUTPATIENT)
Dept: OUTPATIENT SERVICES | Facility: HOSPITAL | Age: 72
LOS: 1 days | End: 2017-10-06
Payer: SELF-PAY

## 2017-10-06 VITALS
WEIGHT: 158 LBS | DIASTOLIC BLOOD PRESSURE: 80 MMHG | OXYGEN SATURATION: 98 % | HEART RATE: 98 BPM | BODY MASS INDEX: 26.98 KG/M2 | SYSTOLIC BLOOD PRESSURE: 128 MMHG | HEIGHT: 64 IN

## 2017-10-06 VITALS — HEIGHT: 64 IN

## 2017-10-06 DIAGNOSIS — Z79.01 LONG TERM (CURRENT) USE OF ANTICOAGULANTS: ICD-10-CM

## 2017-10-06 DIAGNOSIS — Z90.49 ACQUIRED ABSENCE OF OTHER SPECIFIED PARTS OF DIGESTIVE TRACT: Chronic | ICD-10-CM

## 2017-10-06 DIAGNOSIS — R42 DIZZINESS AND GIDDINESS: ICD-10-CM

## 2017-10-06 DIAGNOSIS — I10 ESSENTIAL (PRIMARY) HYPERTENSION: ICD-10-CM

## 2017-10-06 DIAGNOSIS — Z86.79 PERSONAL HISTORY OF OTHER DISEASES OF THE CIRCULATORY SYSTEM: Chronic | ICD-10-CM

## 2017-10-06 LAB
INR PPP: 2.8 RATIO
POCT-PROTHROMBIN TIME: 33.5 SECS
QUALITY CONTROL: YES

## 2017-10-06 PROCEDURE — G0463: CPT

## 2017-10-06 PROCEDURE — 85610 PROTHROMBIN TIME: CPT

## 2017-10-11 DIAGNOSIS — I48.91 UNSPECIFIED ATRIAL FIBRILLATION: ICD-10-CM

## 2017-10-25 ENCOUNTER — APPOINTMENT (OUTPATIENT)
Dept: CARDIOLOGY | Facility: HOSPITAL | Age: 72
End: 2017-10-25

## 2017-10-25 ENCOUNTER — OUTPATIENT (OUTPATIENT)
Dept: OUTPATIENT SERVICES | Facility: HOSPITAL | Age: 72
LOS: 1 days | End: 2017-10-25
Payer: SELF-PAY

## 2017-10-25 ENCOUNTER — NON-APPOINTMENT (OUTPATIENT)
Age: 72
End: 2017-10-25

## 2017-10-25 VITALS — OXYGEN SATURATION: 96 % | DIASTOLIC BLOOD PRESSURE: 80 MMHG | HEART RATE: 107 BPM | SYSTOLIC BLOOD PRESSURE: 125 MMHG

## 2017-10-25 DIAGNOSIS — Z86.79 PERSONAL HISTORY OF OTHER DISEASES OF THE CIRCULATORY SYSTEM: Chronic | ICD-10-CM

## 2017-10-25 DIAGNOSIS — Z90.49 ACQUIRED ABSENCE OF OTHER SPECIFIED PARTS OF DIGESTIVE TRACT: Chronic | ICD-10-CM

## 2017-10-25 DIAGNOSIS — I25.10 ATHEROSCLEROTIC HEART DISEASE OF NATIVE CORONARY ARTERY WITHOUT ANGINA PECTORIS: ICD-10-CM

## 2017-10-25 PROCEDURE — 93005 ELECTROCARDIOGRAM TRACING: CPT

## 2017-10-25 PROCEDURE — G0463: CPT

## 2017-10-26 DIAGNOSIS — I48.91 UNSPECIFIED ATRIAL FIBRILLATION: ICD-10-CM

## 2017-11-03 ENCOUNTER — APPOINTMENT (OUTPATIENT)
Dept: NEUROSURGERY | Facility: CLINIC | Age: 72
End: 2017-11-03

## 2017-11-08 ENCOUNTER — APPOINTMENT (OUTPATIENT)
Dept: INTERNAL MEDICINE | Facility: CLINIC | Age: 72
End: 2017-11-08

## 2017-11-08 ENCOUNTER — RESULT CHARGE (OUTPATIENT)
Age: 72
End: 2017-11-08

## 2017-11-08 ENCOUNTER — OUTPATIENT (OUTPATIENT)
Dept: OUTPATIENT SERVICES | Facility: HOSPITAL | Age: 72
LOS: 1 days | End: 2017-11-08
Payer: SELF-PAY

## 2017-11-08 ENCOUNTER — APPOINTMENT (OUTPATIENT)
Dept: CARDIOLOGY | Facility: HOSPITAL | Age: 72
End: 2017-11-08

## 2017-11-08 VITALS
WEIGHT: 156 LBS | BODY MASS INDEX: 26.63 KG/M2 | OXYGEN SATURATION: 97 % | HEART RATE: 82 BPM | HEIGHT: 64 IN | SYSTOLIC BLOOD PRESSURE: 127 MMHG | DIASTOLIC BLOOD PRESSURE: 82 MMHG

## 2017-11-08 DIAGNOSIS — Z86.79 PERSONAL HISTORY OF OTHER DISEASES OF THE CIRCULATORY SYSTEM: Chronic | ICD-10-CM

## 2017-11-08 DIAGNOSIS — I25.10 ATHEROSCLEROTIC HEART DISEASE OF NATIVE CORONARY ARTERY WITHOUT ANGINA PECTORIS: ICD-10-CM

## 2017-11-08 DIAGNOSIS — Z79.01 LONG TERM (CURRENT) USE OF ANTICOAGULANTS: ICD-10-CM

## 2017-11-08 DIAGNOSIS — Z90.49 ACQUIRED ABSENCE OF OTHER SPECIFIED PARTS OF DIGESTIVE TRACT: Chronic | ICD-10-CM

## 2017-11-08 DIAGNOSIS — I48.91 UNSPECIFIED ATRIAL FIBRILLATION: ICD-10-CM

## 2017-11-08 LAB
INR PPP: 2.6 RATIO
POCT-PROTHROMBIN TIME: 31.2 SECS
QUALITY CONTROL: YES

## 2017-11-08 PROCEDURE — 85610 PROTHROMBIN TIME: CPT

## 2017-11-08 PROCEDURE — G0463: CPT

## 2017-11-08 RX ORDER — METOPROLOL TARTRATE 25 MG/1
25 TABLET, FILM COATED ORAL TWICE DAILY
Qty: 60 | Refills: 1 | Status: DISCONTINUED | COMMUNITY
Start: 2017-10-25 | End: 2017-11-08

## 2017-11-09 DIAGNOSIS — I48.91 UNSPECIFIED ATRIAL FIBRILLATION: ICD-10-CM

## 2017-12-13 ENCOUNTER — APPOINTMENT (OUTPATIENT)
Dept: CARDIOLOGY | Facility: HOSPITAL | Age: 72
End: 2017-12-13

## 2017-12-13 ENCOUNTER — OUTPATIENT (OUTPATIENT)
Dept: OUTPATIENT SERVICES | Facility: HOSPITAL | Age: 72
LOS: 1 days | End: 2017-12-13
Payer: SELF-PAY

## 2017-12-13 ENCOUNTER — RESULT CHARGE (OUTPATIENT)
Age: 72
End: 2017-12-13

## 2017-12-13 ENCOUNTER — APPOINTMENT (OUTPATIENT)
Dept: INTERNAL MEDICINE | Facility: CLINIC | Age: 72
End: 2017-12-13

## 2017-12-13 ENCOUNTER — NON-APPOINTMENT (OUTPATIENT)
Age: 72
End: 2017-12-13

## 2017-12-13 VITALS
HEART RATE: 82 BPM | SYSTOLIC BLOOD PRESSURE: 128 MMHG | HEIGHT: 64 IN | OXYGEN SATURATION: 98 % | DIASTOLIC BLOOD PRESSURE: 84 MMHG

## 2017-12-13 DIAGNOSIS — Z86.79 PERSONAL HISTORY OF OTHER DISEASES OF THE CIRCULATORY SYSTEM: Chronic | ICD-10-CM

## 2017-12-13 DIAGNOSIS — Z90.49 ACQUIRED ABSENCE OF OTHER SPECIFIED PARTS OF DIGESTIVE TRACT: Chronic | ICD-10-CM

## 2017-12-13 DIAGNOSIS — Z79.01 LONG TERM (CURRENT) USE OF ANTICOAGULANTS: ICD-10-CM

## 2017-12-13 DIAGNOSIS — R42 DIZZINESS AND GIDDINESS: ICD-10-CM

## 2017-12-13 DIAGNOSIS — L29.9 PRURITUS, UNSPECIFIED: ICD-10-CM

## 2017-12-13 DIAGNOSIS — I25.10 ATHEROSCLEROTIC HEART DISEASE OF NATIVE CORONARY ARTERY WITHOUT ANGINA PECTORIS: ICD-10-CM

## 2017-12-13 PROBLEM — H54.7 UNSPECIFIED VISUAL LOSS: Chronic | Status: ACTIVE | Noted: 2017-01-19

## 2017-12-13 PROBLEM — J18.9 PNEUMONIA, UNSPECIFIED ORGANISM: Chronic | Status: ACTIVE | Noted: 2017-01-19

## 2017-12-13 PROBLEM — E78.5 HYPERLIPIDEMIA, UNSPECIFIED: Chronic | Status: ACTIVE | Noted: 2017-01-19

## 2017-12-13 LAB
INR PPP: 2.9 RATIO
POCT-PROTHROMBIN TIME: 34.2 SECS
QUALITY CONTROL: YES

## 2017-12-13 PROCEDURE — 93005 ELECTROCARDIOGRAM TRACING: CPT

## 2017-12-13 PROCEDURE — G0463: CPT

## 2017-12-13 PROCEDURE — 85610 PROTHROMBIN TIME: CPT

## 2017-12-14 DIAGNOSIS — I48.91 UNSPECIFIED ATRIAL FIBRILLATION: ICD-10-CM

## 2017-12-15 DIAGNOSIS — R42 DIZZINESS AND GIDDINESS: ICD-10-CM

## 2017-12-15 DIAGNOSIS — Z79.01 LONG TERM (CURRENT) USE OF ANTICOAGULANTS: ICD-10-CM

## 2018-01-17 ENCOUNTER — OUTPATIENT (OUTPATIENT)
Dept: OUTPATIENT SERVICES | Facility: HOSPITAL | Age: 73
LOS: 1 days | End: 2018-01-17
Payer: SELF-PAY

## 2018-01-17 ENCOUNTER — RESULT CHARGE (OUTPATIENT)
Age: 73
End: 2018-01-17

## 2018-01-17 ENCOUNTER — APPOINTMENT (OUTPATIENT)
Dept: INTERNAL MEDICINE | Facility: CLINIC | Age: 73
End: 2018-01-17

## 2018-01-17 DIAGNOSIS — Z90.49 ACQUIRED ABSENCE OF OTHER SPECIFIED PARTS OF DIGESTIVE TRACT: Chronic | ICD-10-CM

## 2018-01-17 DIAGNOSIS — Z86.79 PERSONAL HISTORY OF OTHER DISEASES OF THE CIRCULATORY SYSTEM: Chronic | ICD-10-CM

## 2018-01-17 LAB
INR PPP: 2.7 RATIO
POCT-PROTHROMBIN TIME: 32.4 SECS
QUALITY CONTROL: YES

## 2018-01-17 PROCEDURE — 85610 PROTHROMBIN TIME: CPT

## 2018-01-18 DIAGNOSIS — Z79.01 LONG TERM (CURRENT) USE OF ANTICOAGULANTS: ICD-10-CM

## 2018-01-18 DIAGNOSIS — I48.91 UNSPECIFIED ATRIAL FIBRILLATION: ICD-10-CM

## 2018-02-21 ENCOUNTER — RESULT CHARGE (OUTPATIENT)
Age: 73
End: 2018-02-21

## 2018-02-21 ENCOUNTER — APPOINTMENT (OUTPATIENT)
Dept: INTERNAL MEDICINE | Facility: CLINIC | Age: 73
End: 2018-02-21

## 2018-02-21 ENCOUNTER — OUTPATIENT (OUTPATIENT)
Dept: OUTPATIENT SERVICES | Facility: HOSPITAL | Age: 73
LOS: 1 days | End: 2018-02-21
Payer: SELF-PAY

## 2018-02-21 VITALS
HEIGHT: 64 IN | SYSTOLIC BLOOD PRESSURE: 100 MMHG | BODY MASS INDEX: 26.46 KG/M2 | OXYGEN SATURATION: 98 % | WEIGHT: 155 LBS | HEART RATE: 86 BPM | DIASTOLIC BLOOD PRESSURE: 76 MMHG

## 2018-02-21 DIAGNOSIS — Z90.49 ACQUIRED ABSENCE OF OTHER SPECIFIED PARTS OF DIGESTIVE TRACT: Chronic | ICD-10-CM

## 2018-02-21 DIAGNOSIS — I63.9 CEREBRAL INFARCTION, UNSPECIFIED: ICD-10-CM

## 2018-02-21 DIAGNOSIS — M85.80 OTHER SPECIFIED DISORDERS OF BONE DENSITY AND STRUCTURE, UNSPECIFIED SITE: ICD-10-CM

## 2018-02-21 DIAGNOSIS — Z01.419 ENCOUNTER FOR GYNECOLOGICAL EXAMINATION (GENERAL) (ROUTINE) W/OUT ABNORMAL FINDINGS: ICD-10-CM

## 2018-02-21 DIAGNOSIS — D18.00 HEMANGIOMA UNSPECIFIED SITE: ICD-10-CM

## 2018-02-21 DIAGNOSIS — I48.91 UNSPECIFIED ATRIAL FIBRILLATION: ICD-10-CM

## 2018-02-21 DIAGNOSIS — I10 ESSENTIAL (PRIMARY) HYPERTENSION: ICD-10-CM

## 2018-02-21 DIAGNOSIS — R10.30 LOWER ABDOMINAL PAIN, UNSPECIFIED: ICD-10-CM

## 2018-02-21 DIAGNOSIS — Z86.79 PERSONAL HISTORY OF OTHER DISEASES OF THE CIRCULATORY SYSTEM: Chronic | ICD-10-CM

## 2018-02-21 DIAGNOSIS — Z87.898 PERSONAL HISTORY OF OTHER SPECIFIED CONDITIONS: ICD-10-CM

## 2018-02-21 PROCEDURE — G0463: CPT

## 2018-02-21 PROCEDURE — 85610 PROTHROMBIN TIME: CPT

## 2018-02-21 RX ORDER — FLUTICASONE PROPIONATE 50 UG/1
50 SPRAY, METERED NASAL DAILY
Qty: 90 | Refills: 0 | Status: DISCONTINUED | COMMUNITY
Start: 2017-06-19 | End: 2018-02-21

## 2018-02-22 PROBLEM — Z01.419 ENCOUNTER FOR ROUTINE GYNECOLOGICAL EXAMINATION WITH PAPANICOLAOU SMEAR OF CERVIX: Status: RESOLVED | Noted: 2017-02-08 | Resolved: 2018-02-22

## 2018-02-22 PROBLEM — R10.30 LOWER ABDOMINAL PAIN: Status: RESOLVED | Noted: 2017-02-08 | Resolved: 2018-02-22

## 2018-02-22 PROBLEM — Z87.898 HISTORY OF SLEEP DISTURBANCE: Status: RESOLVED | Noted: 2017-07-19 | Resolved: 2018-02-22

## 2018-02-22 LAB
INR PPP: 2.5 RATIO
POCT-PROTHROMBIN TIME: 30.2 SECS
QUALITY CONTROL: YES

## 2018-03-14 ENCOUNTER — APPOINTMENT (OUTPATIENT)
Dept: INTERNAL MEDICINE | Facility: CLINIC | Age: 73
End: 2018-03-14

## 2018-03-14 ENCOUNTER — RESULT CHARGE (OUTPATIENT)
Age: 73
End: 2018-03-14

## 2018-03-14 ENCOUNTER — NON-APPOINTMENT (OUTPATIENT)
Age: 73
End: 2018-03-14

## 2018-03-14 ENCOUNTER — OUTPATIENT (OUTPATIENT)
Dept: OUTPATIENT SERVICES | Facility: HOSPITAL | Age: 73
LOS: 1 days | End: 2018-03-14
Payer: SELF-PAY

## 2018-03-14 ENCOUNTER — APPOINTMENT (OUTPATIENT)
Dept: CARDIOLOGY | Facility: HOSPITAL | Age: 73
End: 2018-03-14

## 2018-03-14 ENCOUNTER — RX RENEWAL (OUTPATIENT)
Age: 73
End: 2018-03-14

## 2018-03-14 VITALS
OXYGEN SATURATION: 95 % | HEIGHT: 64 IN | WEIGHT: 155 LBS | HEART RATE: 88 BPM | DIASTOLIC BLOOD PRESSURE: 63 MMHG | BODY MASS INDEX: 26.46 KG/M2 | SYSTOLIC BLOOD PRESSURE: 92 MMHG

## 2018-03-14 VITALS — DIASTOLIC BLOOD PRESSURE: 73 MMHG | SYSTOLIC BLOOD PRESSURE: 123 MMHG

## 2018-03-14 DIAGNOSIS — Z86.79 PERSONAL HISTORY OF OTHER DISEASES OF THE CIRCULATORY SYSTEM: Chronic | ICD-10-CM

## 2018-03-14 DIAGNOSIS — I63.9 CEREBRAL INFARCTION, UNSPECIFIED: ICD-10-CM

## 2018-03-14 DIAGNOSIS — M85.80 OTHER SPECIFIED DISORDERS OF BONE DENSITY AND STRUCTURE, UNSPECIFIED SITE: ICD-10-CM

## 2018-03-14 DIAGNOSIS — I25.10 ATHEROSCLEROTIC HEART DISEASE OF NATIVE CORONARY ARTERY WITHOUT ANGINA PECTORIS: ICD-10-CM

## 2018-03-14 DIAGNOSIS — Z90.49 ACQUIRED ABSENCE OF OTHER SPECIFIED PARTS OF DIGESTIVE TRACT: Chronic | ICD-10-CM

## 2018-03-14 DIAGNOSIS — M79.89 OTHER SPECIFIED SOFT TISSUE DISORDERS: ICD-10-CM

## 2018-03-14 DIAGNOSIS — D18.00 HEMANGIOMA UNSPECIFIED SITE: ICD-10-CM

## 2018-03-14 LAB
INR PPP: 2.5 RATIO
POCT-PROTHROMBIN TIME: 30.2 SECS
QUALITY CONTROL: YES

## 2018-03-14 PROCEDURE — G0463: CPT

## 2018-03-14 PROCEDURE — 93005 ELECTROCARDIOGRAM TRACING: CPT

## 2018-03-15 DIAGNOSIS — I48.91 UNSPECIFIED ATRIAL FIBRILLATION: ICD-10-CM

## 2018-03-15 LAB
ALBUMIN SERPL ELPH-MCNC: 4.4 G/DL
ALP BLD-CCNC: 89 U/L
ALT SERPL-CCNC: 24 U/L
ANION GAP SERPL CALC-SCNC: 16 MMOL/L
AST SERPL-CCNC: 24 U/L
BASOPHILS # BLD AUTO: 0.04 K/UL
BASOPHILS NFR BLD AUTO: 0.6 %
BILIRUB SERPL-MCNC: 0.6 MG/DL
BUN SERPL-MCNC: 14 MG/DL
CALCIUM SERPL-MCNC: 9.5 MG/DL
CHLORIDE SERPL-SCNC: 103 MMOL/L
CHOLEST SERPL-MCNC: 149 MG/DL
CHOLEST/HDLC SERPL: 1.9 RATIO
CO2 SERPL-SCNC: 23 MMOL/L
CREAT SERPL-MCNC: 0.58 MG/DL
EOSINOPHIL # BLD AUTO: 0.25 K/UL
EOSINOPHIL NFR BLD AUTO: 4 %
GLUCOSE SERPL-MCNC: 84 MG/DL
HCT VFR BLD CALC: 45.2 %
HDLC SERPL-MCNC: 80 MG/DL
HGB BLD-MCNC: 14.6 G/DL
IMM GRANULOCYTES NFR BLD AUTO: 0.2 %
LDLC SERPL CALC-MCNC: 55 MG/DL
LYMPHOCYTES # BLD AUTO: 1.35 K/UL
LYMPHOCYTES NFR BLD AUTO: 21.8 %
MAN DIFF?: NORMAL
MCHC RBC-ENTMCNC: 29 PG
MCHC RBC-ENTMCNC: 32.3 GM/DL
MCV RBC AUTO: 89.7 FL
MONOCYTES # BLD AUTO: 0.4 K/UL
MONOCYTES NFR BLD AUTO: 6.5 %
NEUTROPHILS # BLD AUTO: 4.13 K/UL
NEUTROPHILS NFR BLD AUTO: 66.9 %
PLATELET # BLD AUTO: 296 K/UL
POTASSIUM SERPL-SCNC: 4.2 MMOL/L
PROT SERPL-MCNC: 7.5 G/DL
RBC # BLD: 5.04 M/UL
RBC # FLD: 14 %
SODIUM SERPL-SCNC: 142 MMOL/L
TRIGL SERPL-MCNC: 69 MG/DL
WBC # FLD AUTO: 6.18 K/UL

## 2018-03-16 ENCOUNTER — TRANSCRIPTION ENCOUNTER (OUTPATIENT)
Age: 73
End: 2018-03-16

## 2018-03-27 ENCOUNTER — APPOINTMENT (OUTPATIENT)
Dept: ULTRASOUND IMAGING | Facility: HOSPITAL | Age: 73
End: 2018-03-27
Payer: COMMERCIAL

## 2018-03-27 ENCOUNTER — OUTPATIENT (OUTPATIENT)
Dept: OUTPATIENT SERVICES | Facility: HOSPITAL | Age: 73
LOS: 1 days | End: 2018-03-27
Payer: SELF-PAY

## 2018-03-27 DIAGNOSIS — I48.91 UNSPECIFIED ATRIAL FIBRILLATION: ICD-10-CM

## 2018-03-27 DIAGNOSIS — M79.89 OTHER SPECIFIED SOFT TISSUE DISORDERS: ICD-10-CM

## 2018-03-27 DIAGNOSIS — D18.00 HEMANGIOMA UNSPECIFIED SITE: ICD-10-CM

## 2018-03-27 DIAGNOSIS — Z86.79 PERSONAL HISTORY OF OTHER DISEASES OF THE CIRCULATORY SYSTEM: Chronic | ICD-10-CM

## 2018-03-27 DIAGNOSIS — M85.80 OTHER SPECIFIED DISORDERS OF BONE DENSITY AND STRUCTURE, UNSPECIFIED SITE: ICD-10-CM

## 2018-03-27 DIAGNOSIS — Z90.49 ACQUIRED ABSENCE OF OTHER SPECIFIED PARTS OF DIGESTIVE TRACT: Chronic | ICD-10-CM

## 2018-03-27 PROCEDURE — 93970 EXTREMITY STUDY: CPT | Mod: 26

## 2018-03-27 PROCEDURE — 93970 EXTREMITY STUDY: CPT

## 2018-04-12 ENCOUNTER — RESULT CHARGE (OUTPATIENT)
Age: 73
End: 2018-04-12

## 2018-04-12 ENCOUNTER — APPOINTMENT (OUTPATIENT)
Dept: OPHTHALMOLOGY | Facility: CLINIC | Age: 73
End: 2018-04-12

## 2018-04-12 ENCOUNTER — APPOINTMENT (OUTPATIENT)
Dept: INTERNAL MEDICINE | Facility: CLINIC | Age: 73
End: 2018-04-12

## 2018-04-15 LAB
INR PPP: 2.3 RATIO
POCT-PROTHROMBIN TIME: 27.2 SECS
QUALITY CONTROL: YES

## 2018-05-01 ENCOUNTER — APPOINTMENT (OUTPATIENT)
Dept: OPHTHALMOLOGY | Facility: CLINIC | Age: 73
End: 2018-05-01

## 2018-05-09 ENCOUNTER — APPOINTMENT (OUTPATIENT)
Dept: INTERNAL MEDICINE | Facility: CLINIC | Age: 73
End: 2018-05-09

## 2018-05-09 ENCOUNTER — RESULT CHARGE (OUTPATIENT)
Age: 73
End: 2018-05-09

## 2018-05-09 LAB
INR PPP: 1.9 RATIO
POCT-PROTHROMBIN TIME: 22.4 SECS
QUALITY CONTROL: YES

## 2018-05-09 RX ORDER — WARFARIN 3 MG/1
3 TABLET ORAL
Qty: 90 | Refills: 1 | Status: DISCONTINUED | COMMUNITY
Start: 2017-01-11 | End: 2018-05-09

## 2018-05-23 ENCOUNTER — APPOINTMENT (OUTPATIENT)
Dept: OPHTHALMOLOGY | Facility: CLINIC | Age: 73
End: 2018-05-23

## 2018-06-08 ENCOUNTER — OUTPATIENT (OUTPATIENT)
Dept: OUTPATIENT SERVICES | Facility: HOSPITAL | Age: 73
LOS: 1 days | End: 2018-06-08
Payer: SELF-PAY

## 2018-06-08 ENCOUNTER — APPOINTMENT (OUTPATIENT)
Dept: INTERNAL MEDICINE | Facility: CLINIC | Age: 73
End: 2018-06-08

## 2018-06-08 ENCOUNTER — LABORATORY RESULT (OUTPATIENT)
Age: 73
End: 2018-06-08

## 2018-06-08 VITALS — HEART RATE: 87 BPM | DIASTOLIC BLOOD PRESSURE: 65 MMHG | SYSTOLIC BLOOD PRESSURE: 115 MMHG | OXYGEN SATURATION: 96 %

## 2018-06-08 DIAGNOSIS — M85.80 OTHER SPECIFIED DISORDERS OF BONE DENSITY AND STRUCTURE, UNSPECIFIED SITE: ICD-10-CM

## 2018-06-08 DIAGNOSIS — Z86.79 PERSONAL HISTORY OF OTHER DISEASES OF THE CIRCULATORY SYSTEM: Chronic | ICD-10-CM

## 2018-06-08 DIAGNOSIS — Z90.49 ACQUIRED ABSENCE OF OTHER SPECIFIED PARTS OF DIGESTIVE TRACT: Chronic | ICD-10-CM

## 2018-06-08 DIAGNOSIS — I83.93 ASYMPTOMATIC VARICOSE VEINS OF BILATERAL LOWER EXTREMITIES: ICD-10-CM

## 2018-06-08 DIAGNOSIS — I10 ESSENTIAL (PRIMARY) HYPERTENSION: ICD-10-CM

## 2018-06-08 PROCEDURE — G0463: CPT

## 2018-06-08 PROCEDURE — 85652 RBC SED RATE AUTOMATED: CPT

## 2018-06-09 LAB — ERYTHROCYTE [SEDIMENTATION RATE] IN BLOOD: 16 MM/HR — SIGNIFICANT CHANGE UP (ref 0–20)

## 2018-06-11 NOTE — HISTORY OF PRESENT ILLNESS
[[employer] ] : provided by [employer]   [FreeTextEntry1] : [id number] [de-identified] : 71 yo Mongolian speaking F pmhx CVA c/b dysphagia, right arm weakness and b/l visual field deficits and Afib on Coumadin s/p failed DCCV with frequent episodes of RVR presents for follow up \par \par Patient reports she is having L temporal region pain for one week. Described as pulsatile in nature. Nonradiating. Occurs 5-6 times a day and  lasts 1-2 minutes at a time. No associated jaw claudication, blurry vision (at baseline). It is not the worst headache of her life. Denies nausea, vomiting, phonophobia, photophobia, aura (olfactory). Has rhinorrhea at baseline but it is not unilateral and no eye lacrimation. No band like feeling on her head. \par \par Patient thinks she may have forgotten to take her Coumadin 6mg on Wednesday

## 2018-06-11 NOTE — REVIEW OF SYSTEMS
[Headache] : headache [Fever] : no fever [Chills] : no chills [Discharge] : no discharge [Pain] : no pain [Chest Pain] : no chest pain [Palpitations] : no palpitations [Lower Ext Edema] : no lower extremity edema [Shortness Of Breath] : no shortness of breath [Cough] : no cough [Abdominal Pain] : no abdominal pain [Vomiting] : no vomiting [Skin Rash] : no skin rash [Dizziness] : no dizziness [Fainting] : no fainting

## 2018-06-11 NOTE — PHYSICAL EXAM
[No Acute Distress] : no acute distress [Well Nourished] : well nourished [Well Developed] : well developed [Well-Appearing] : well-appearing [Normal Voice/Communication] : normal voice/communication [Normal Sclera/Conjunctiva] : normal sclera/conjunctiva [PERRL] : pupils equal round and reactive to light [Normal Outer Ear/Nose] : the outer ears and nose were normal in appearance [Normal Oropharynx] : the oropharynx was normal [No Respiratory Distress] : no respiratory distress  [Clear to Auscultation] : lungs were clear to auscultation bilaterally [No Accessory Muscle Use] : no accessory muscle use [Normal Rate] : normal rate  [Regular Rhythm] : with a regular rhythm [Normal S1, S2] : normal S1 and S2 [No Rash] : no rash [Normal Gait] : normal gait [Speech Grossly Normal] : speech grossly normal [Normal Affect] : the affect was normal [Alert and Oriented x3] : oriented to person, place, and time [Normal Insight/Judgement] : insight and judgment were intact [de-identified] : no temporal region tenderness  [de-identified] : unable to visualize L TM due to cerumen  [de-identified] : prominent veins visualized bilaterally

## 2018-06-11 NOTE — ASSESSMENT
[FreeTextEntry1] : 71 yo Romanian speaking F pmhx CVA c/b dysphagia, right arm weakness and b/l visual field deficits and Afib on Coumadin s/p failed DCCV with frequent episodes of RVR presents for follow up \par \par # Headache may be due to migraines. Unlikely cluster or tension at this time. \par - R/O GCA by checking ESR, if elevated would start prednisone \par - If ESR is negative would recommend Tylenol as needed \par \par # Atrial Fibrillation on Coumadin s/p failed DCCV with frequent episodes of RVR\par - Rate controlled on Cardizem 180 and Toprol 12.5 XL (digoxin held for SOB)\par - INR this afternoon 1.6, will switch from Coumadin 6mg W/F/Sun and Coumadin 3mg T/Th/Sat/M to Coumadin 6mg W/F/Sat/Sun and 3mg M/T/Th\par - WIll RTC next Friday for repeat INR check, discussed with nursing team \par \par # Varicose Vein with venous insufficiency \par - Recommended PT for banding and compression stocking\par - PT location provided \par \par \par \par \par

## 2018-06-12 NOTE — END OF VISIT
TSH, BMP and Lipid panel today.        Information about cholesterol, high blood pressure and healthy diet and activity recommendations can be found at the following links on the Internet.    http://www.nhlbi.nih.gov/health/health-topics/topics/hbc    http://www.nhlbi.nih.gov/health/educational/lose_wt/index.htm    Http://www.nhlbi.nih.gov/files/docs/public/heart/hbp_low.pdf    http://www.heart.org/HEARTORG/    http://diabetes.org/    https://www.cdc.gov/    https://healthfinder.gov/     [] : Resident

## 2018-06-15 DIAGNOSIS — M31.6 OTHER GIANT CELL ARTERITIS: ICD-10-CM

## 2018-06-15 DIAGNOSIS — I48.91 UNSPECIFIED ATRIAL FIBRILLATION: ICD-10-CM

## 2018-06-15 DIAGNOSIS — I83.93 ASYMPTOMATIC VARICOSE VEINS OF BILATERAL LOWER EXTREMITIES: ICD-10-CM

## 2018-06-18 ENCOUNTER — APPOINTMENT (OUTPATIENT)
Dept: INTERNAL MEDICINE | Facility: CLINIC | Age: 73
End: 2018-06-18

## 2018-06-18 ENCOUNTER — RESULT CHARGE (OUTPATIENT)
Age: 73
End: 2018-06-18

## 2018-06-18 LAB
INR PPP: 2 RATIO
POCT-PROTHROMBIN TIME: 23.6 SECS
QUALITY CONTROL: YES

## 2018-06-19 LAB
INR PPP: 1.6 RATIO
POCT-PROTHROMBIN TIME: 19.6 SECS
QUALITY CONTROL: YES

## 2018-06-22 ENCOUNTER — APPOINTMENT (OUTPATIENT)
Dept: OPHTHALMOLOGY | Facility: CLINIC | Age: 73
End: 2018-06-22

## 2018-06-28 ENCOUNTER — OUTPATIENT (OUTPATIENT)
Dept: OUTPATIENT SERVICES | Facility: HOSPITAL | Age: 73
LOS: 1 days | End: 2018-06-28
Payer: SELF-PAY

## 2018-06-28 ENCOUNTER — APPOINTMENT (OUTPATIENT)
Dept: INTERNAL MEDICINE | Facility: CLINIC | Age: 73
End: 2018-06-28
Payer: SELF-PAY

## 2018-06-28 ENCOUNTER — RESULT CHARGE (OUTPATIENT)
Age: 73
End: 2018-06-28

## 2018-06-28 DIAGNOSIS — M31.6 OTHER GIANT CELL ARTERITIS: ICD-10-CM

## 2018-06-28 DIAGNOSIS — Z90.49 ACQUIRED ABSENCE OF OTHER SPECIFIED PARTS OF DIGESTIVE TRACT: Chronic | ICD-10-CM

## 2018-06-28 DIAGNOSIS — Z79.01 LONG TERM (CURRENT) USE OF ANTICOAGULANTS: ICD-10-CM

## 2018-06-28 DIAGNOSIS — I48.91 UNSPECIFIED ATRIAL FIBRILLATION: ICD-10-CM

## 2018-06-28 DIAGNOSIS — Z86.79 PERSONAL HISTORY OF OTHER DISEASES OF THE CIRCULATORY SYSTEM: Chronic | ICD-10-CM

## 2018-06-28 DIAGNOSIS — I83.93 ASYMPTOMATIC VARICOSE VEINS OF BILATERAL LOWER EXTREMITIES: ICD-10-CM

## 2018-06-28 LAB
INR PPP: 2.5 RATIO
POCT-PROTHROMBIN TIME: 29.6 SECS
QUALITY CONTROL: YES

## 2018-06-28 PROCEDURE — 85610 PROTHROMBIN TIME: CPT

## 2018-06-28 PROCEDURE — ZZZZZ: CPT

## 2018-07-18 ENCOUNTER — NON-APPOINTMENT (OUTPATIENT)
Age: 73
End: 2018-07-18

## 2018-07-18 ENCOUNTER — RESULT CHARGE (OUTPATIENT)
Age: 73
End: 2018-07-18

## 2018-07-18 ENCOUNTER — OUTPATIENT (OUTPATIENT)
Dept: OUTPATIENT SERVICES | Facility: HOSPITAL | Age: 73
LOS: 1 days | End: 2018-07-18
Payer: SELF-PAY

## 2018-07-18 ENCOUNTER — APPOINTMENT (OUTPATIENT)
Dept: INTERNAL MEDICINE | Facility: CLINIC | Age: 73
End: 2018-07-18

## 2018-07-18 VITALS
DIASTOLIC BLOOD PRESSURE: 70 MMHG | BODY MASS INDEX: 24.59 KG/M2 | WEIGHT: 144 LBS | HEIGHT: 64 IN | SYSTOLIC BLOOD PRESSURE: 110 MMHG

## 2018-07-18 DIAGNOSIS — Z90.49 ACQUIRED ABSENCE OF OTHER SPECIFIED PARTS OF DIGESTIVE TRACT: Chronic | ICD-10-CM

## 2018-07-18 DIAGNOSIS — I10 ESSENTIAL (PRIMARY) HYPERTENSION: ICD-10-CM

## 2018-07-18 DIAGNOSIS — Z87.898 PERSONAL HISTORY OF OTHER SPECIFIED CONDITIONS: ICD-10-CM

## 2018-07-18 DIAGNOSIS — M85.80 OTHER SPECIFIED DISORDERS OF BONE DENSITY AND STRUCTURE, UNSPECIFIED SITE: ICD-10-CM

## 2018-07-18 DIAGNOSIS — Z86.79 PERSONAL HISTORY OF OTHER DISEASES OF THE CIRCULATORY SYSTEM: Chronic | ICD-10-CM

## 2018-07-18 DIAGNOSIS — M54.6 PAIN IN THORACIC SPINE: ICD-10-CM

## 2018-07-18 DIAGNOSIS — I83.93 ASYMPTOMATIC VARICOSE VEINS OF BILATERAL LOWER EXTREMITIES: ICD-10-CM

## 2018-07-18 DIAGNOSIS — G89.29 PAIN IN THORACIC SPINE: ICD-10-CM

## 2018-07-18 DIAGNOSIS — Z87.19 PERSONAL HISTORY OF OTHER DISEASES OF THE DIGESTIVE SYSTEM: ICD-10-CM

## 2018-07-18 DIAGNOSIS — Z86.73 PERSONAL HISTORY OF TRANSIENT ISCHEMIC ATTACK (TIA), AND CEREBRAL INFARCTION W/OUT RESIDUAL DEFICITS: ICD-10-CM

## 2018-07-18 DIAGNOSIS — F45.8 OTHER SOMATOFORM DISORDERS: ICD-10-CM

## 2018-07-18 DIAGNOSIS — M31.6 OTHER GIANT CELL ARTERITIS: ICD-10-CM

## 2018-07-18 DIAGNOSIS — Z86.79 PERSONAL HISTORY OF OTHER DISEASES OF THE CIRCULATORY SYSTEM: ICD-10-CM

## 2018-07-18 DIAGNOSIS — I48.91 UNSPECIFIED ATRIAL FIBRILLATION: ICD-10-CM

## 2018-07-18 DIAGNOSIS — Z86.69 PERSONAL HISTORY OF OTHER DISEASES OF THE NERVOUS SYSTEM AND SENSE ORGANS: ICD-10-CM

## 2018-07-18 LAB
INR PPP: 2.8 RATIO
POCT-PROTHROMBIN TIME: 33.9 SECS
QUALITY CONTROL: YES

## 2018-07-18 PROCEDURE — G0463: CPT

## 2018-07-18 PROCEDURE — 85610 PROTHROMBIN TIME: CPT

## 2018-07-18 NOTE — COUNSELING
[Weight management counseling provided] : Weight management [Activity counseling provided] : activity

## 2018-07-19 LAB
ANION GAP SERPL CALC-SCNC: 12 MMOL/L
BASOPHILS # BLD AUTO: 0.03 K/UL
BASOPHILS NFR BLD AUTO: 0.4 %
BUN SERPL-MCNC: 13 MG/DL
CALCIUM SERPL-MCNC: 9.6 MG/DL
CHLORIDE SERPL-SCNC: 102 MMOL/L
CO2 SERPL-SCNC: 27 MMOL/L
CREAT SERPL-MCNC: 0.62 MG/DL
EOSINOPHIL # BLD AUTO: 0.25 K/UL
EOSINOPHIL NFR BLD AUTO: 3.6 %
GLUCOSE SERPL-MCNC: 81 MG/DL
HCT VFR BLD CALC: 44.6 %
HGB BLD-MCNC: 13.9 G/DL
IMM GRANULOCYTES NFR BLD AUTO: 0.1 %
LYMPHOCYTES # BLD AUTO: 1.49 K/UL
LYMPHOCYTES NFR BLD AUTO: 21.3 %
MAN DIFF?: NORMAL
MCHC RBC-ENTMCNC: 27.9 PG
MCHC RBC-ENTMCNC: 31.2 GM/DL
MCV RBC AUTO: 89.6 FL
MONOCYTES # BLD AUTO: 0.46 K/UL
MONOCYTES NFR BLD AUTO: 6.6 %
NEUTROPHILS # BLD AUTO: 4.75 K/UL
NEUTROPHILS NFR BLD AUTO: 68 %
PLATELET # BLD AUTO: 294 K/UL
POTASSIUM SERPL-SCNC: 4.4 MMOL/L
RBC # BLD: 4.98 M/UL
RBC # FLD: 13.6 %
SODIUM SERPL-SCNC: 141 MMOL/L
WBC # FLD AUTO: 6.99 K/UL

## 2018-07-19 NOTE — ASSESSMENT
[Patient Optimized for Surgery] : Patient optimized for surgery [ECG] : ECG [Continue anticoagulant treatment as is] : Continue current anticoagulant treatment [As per surgery] : as per surgery [FreeTextEntry4] : 71 yo Panamanian speaking F pmhx CVA c/b dysphagia, right arm weakness and b/l visual field deficits and AFib on Coumadin s/p failed DCCV with frequent episodes of RVR presents for med clearance. \par \par # Pre-op clearance\par - EKG wnl,  No ST elevations, depressions or T wave inversions\par - CBC and BMP pending. INR 2.8 today c/w current Coumadin regimen. Recommend c/w Coumadin through procedure\par - Pt optimized for surgery  pending lab results. She is intermediate risk for a low risk procedure\par - RCRI Class II Risk 0.9% Major Cardiac event. \par \par Case d/w Dr. Gomez

## 2018-07-19 NOTE — ADDENDUM
[FreeTextEntry1] : CBC and BMP reviewed wnl. Pt notified and expressed understanding. Pt optimized for surgery.

## 2018-07-19 NOTE — END OF VISIT
[] : Resident [FreeTextEntry3] : Given Elevated CHADS-VASC Score (with history of a CVA) and procedure with generally low bleeding risk determination is continuing Warfarin is more beneficial than the risks of bleeding.

## 2018-07-19 NOTE — HISTORY OF PRESENT ILLNESS
[Atrial Fibrillation] : atrial fibrillation [( Patient denies any chest pain, claudication, dyspnea on exertion, orthopnea, palpitations or syncope )] : Patient denies any chest pain, claudication, dyspnea on exertion, orthopnea, palpitations or syncope. [Good (7-10 METs)] : Good (7-10 METs) [Coronary Artery Disease] : no coronary artery disease [Diabetes] : no diabetes [Sleep Apnea] : no sleep apnea [Previous Adverse Anesthesia Reaction] : no previous adverse anesthesia reaction [FreeTextEntry1] : Cataract surgery [FreeTextEntry3] : Dr. Lara [FreeTextEntry4] : 73 yo Singaporean speaking F pmhx CVA c/b dysphagia, right arm weakness and b/l visual field deficits and Afib on Coumadin s/p failed DCCV with frequent episodes of RVR presents for med clearance. Pt is scheduled for cataract surgery of L eye. Overall, pt reports feeling well w/ no active complaints. She says occasionally she has lower back pain, worse with sharp movements.  She says she also  can "feel her heart beating" at night and she noticed this following her CVA. Denies any fevers,chills, n/v, CP, palpitations, SOB, abdominal pain, dysuria, melena or hematochezia

## 2018-07-23 DIAGNOSIS — Z79.01 LONG TERM (CURRENT) USE OF ANTICOAGULANTS: ICD-10-CM

## 2018-07-23 DIAGNOSIS — Z01.818 ENCOUNTER FOR OTHER PREPROCEDURAL EXAMINATION: ICD-10-CM

## 2018-08-01 ENCOUNTER — APPOINTMENT (OUTPATIENT)
Dept: CARDIOLOGY | Facility: HOSPITAL | Age: 73
End: 2018-08-01

## 2018-08-01 ENCOUNTER — OUTPATIENT (OUTPATIENT)
Dept: OUTPATIENT SERVICES | Facility: HOSPITAL | Age: 73
LOS: 1 days | End: 2018-08-01
Payer: SELF-PAY

## 2018-08-01 VITALS
DIASTOLIC BLOOD PRESSURE: 61 MMHG | SYSTOLIC BLOOD PRESSURE: 105 MMHG | OXYGEN SATURATION: 98 % | HEART RATE: 72 BPM | HEIGHT: 64 IN

## 2018-08-01 DIAGNOSIS — Z01.818 ENCOUNTER FOR OTHER PREPROCEDURAL EXAMINATION: ICD-10-CM

## 2018-08-01 DIAGNOSIS — Z79.01 LONG TERM (CURRENT) USE OF ANTICOAGULANTS: ICD-10-CM

## 2018-08-01 DIAGNOSIS — I25.10 ATHEROSCLEROTIC HEART DISEASE OF NATIVE CORONARY ARTERY WITHOUT ANGINA PECTORIS: ICD-10-CM

## 2018-08-01 DIAGNOSIS — Z86.79 PERSONAL HISTORY OF OTHER DISEASES OF THE CIRCULATORY SYSTEM: Chronic | ICD-10-CM

## 2018-08-01 DIAGNOSIS — Z90.49 ACQUIRED ABSENCE OF OTHER SPECIFIED PARTS OF DIGESTIVE TRACT: Chronic | ICD-10-CM

## 2018-08-01 PROCEDURE — G0463: CPT

## 2018-08-02 DIAGNOSIS — R00.2 PALPITATIONS: ICD-10-CM

## 2018-08-02 DIAGNOSIS — I48.91 UNSPECIFIED ATRIAL FIBRILLATION: ICD-10-CM

## 2018-08-02 DIAGNOSIS — I74.09 OTHER ARTERIAL EMBOLISM AND THROMBOSIS OF ABDOMINAL AORTA: ICD-10-CM

## 2018-08-02 LAB — HBA1C MFR BLD HPLC: 5.8 %

## 2018-08-06 ENCOUNTER — TRANSCRIPTION ENCOUNTER (OUTPATIENT)
Age: 73
End: 2018-08-06

## 2018-08-07 ENCOUNTER — OUTPATIENT (OUTPATIENT)
Dept: OUTPATIENT SERVICES | Facility: HOSPITAL | Age: 73
LOS: 1 days | End: 2018-08-07
Payer: SELF-PAY

## 2018-08-07 VITALS
OXYGEN SATURATION: 98 % | HEART RATE: 79 BPM | SYSTOLIC BLOOD PRESSURE: 120 MMHG | RESPIRATION RATE: 14 BRPM | DIASTOLIC BLOOD PRESSURE: 67 MMHG

## 2018-08-07 VITALS
WEIGHT: 142.42 LBS | SYSTOLIC BLOOD PRESSURE: 115 MMHG | OXYGEN SATURATION: 97 % | HEIGHT: 63.5 IN | HEART RATE: 79 BPM | RESPIRATION RATE: 18 BRPM | TEMPERATURE: 98 F | DIASTOLIC BLOOD PRESSURE: 79 MMHG

## 2018-08-07 DIAGNOSIS — H25.812 COMBINED FORMS OF AGE-RELATED CATARACT, LEFT EYE: ICD-10-CM

## 2018-08-07 DIAGNOSIS — Z86.79 PERSONAL HISTORY OF OTHER DISEASES OF THE CIRCULATORY SYSTEM: Chronic | ICD-10-CM

## 2018-08-07 DIAGNOSIS — Z90.49 ACQUIRED ABSENCE OF OTHER SPECIFIED PARTS OF DIGESTIVE TRACT: Chronic | ICD-10-CM

## 2018-08-07 PROCEDURE — 66984 XCAPSL CTRC RMVL W/O ECP: CPT | Mod: LT

## 2018-08-07 PROCEDURE — V2632: CPT

## 2018-08-07 NOTE — ASU DISCHARGE PLAN (ADULT/PEDIATRIC). - NOTIFY
Fever greater than 101/Bleeding that does not stop/Pain not relieved by Medications/Swelling that continues/Persistent Nausea and Vomiting

## 2018-08-08 ENCOUNTER — APPOINTMENT (OUTPATIENT)
Dept: OPHTHALMOLOGY | Facility: CLINIC | Age: 73
End: 2018-08-08

## 2018-08-16 ENCOUNTER — APPOINTMENT (OUTPATIENT)
Dept: INTERNAL MEDICINE | Facility: CLINIC | Age: 73
End: 2018-08-16

## 2018-08-16 ENCOUNTER — RESULT CHARGE (OUTPATIENT)
Age: 73
End: 2018-08-16

## 2018-08-16 ENCOUNTER — OUTPATIENT (OUTPATIENT)
Dept: OUTPATIENT SERVICES | Facility: HOSPITAL | Age: 73
LOS: 1 days | End: 2018-08-16
Payer: SELF-PAY

## 2018-08-16 DIAGNOSIS — Z79.01 LONG TERM (CURRENT) USE OF ANTICOAGULANTS: ICD-10-CM

## 2018-08-16 DIAGNOSIS — Z86.79 PERSONAL HISTORY OF OTHER DISEASES OF THE CIRCULATORY SYSTEM: Chronic | ICD-10-CM

## 2018-08-16 DIAGNOSIS — M85.80 OTHER SPECIFIED DISORDERS OF BONE DENSITY AND STRUCTURE, UNSPECIFIED SITE: ICD-10-CM

## 2018-08-16 DIAGNOSIS — Z01.818 ENCOUNTER FOR OTHER PREPROCEDURAL EXAMINATION: ICD-10-CM

## 2018-08-16 DIAGNOSIS — Z90.49 ACQUIRED ABSENCE OF OTHER SPECIFIED PARTS OF DIGESTIVE TRACT: Chronic | ICD-10-CM

## 2018-08-16 DIAGNOSIS — I48.91 UNSPECIFIED ATRIAL FIBRILLATION: ICD-10-CM

## 2018-08-16 LAB
INR PPP: 2.9 RATIO
POCT-PROTHROMBIN TIME: 35 SECS
QUALITY CONTROL: YES

## 2018-08-16 PROCEDURE — 85610 PROTHROMBIN TIME: CPT

## 2018-08-17 ENCOUNTER — APPOINTMENT (OUTPATIENT)
Dept: OPHTHALMOLOGY | Facility: CLINIC | Age: 73
End: 2018-08-17

## 2018-09-12 ENCOUNTER — APPOINTMENT (OUTPATIENT)
Dept: INTERNAL MEDICINE | Facility: CLINIC | Age: 73
End: 2018-09-12

## 2018-09-12 ENCOUNTER — NON-APPOINTMENT (OUTPATIENT)
Age: 73
End: 2018-09-12

## 2018-09-12 ENCOUNTER — OUTPATIENT (OUTPATIENT)
Dept: OUTPATIENT SERVICES | Facility: HOSPITAL | Age: 73
LOS: 1 days | End: 2018-09-12
Payer: SELF-PAY

## 2018-09-12 ENCOUNTER — APPOINTMENT (OUTPATIENT)
Dept: CARDIOLOGY | Facility: HOSPITAL | Age: 73
End: 2018-09-12

## 2018-09-12 VITALS — OXYGEN SATURATION: 97 % | HEART RATE: 70 BPM | DIASTOLIC BLOOD PRESSURE: 70 MMHG | SYSTOLIC BLOOD PRESSURE: 109 MMHG

## 2018-09-12 DIAGNOSIS — Z90.49 ACQUIRED ABSENCE OF OTHER SPECIFIED PARTS OF DIGESTIVE TRACT: Chronic | ICD-10-CM

## 2018-09-12 DIAGNOSIS — I25.10 ATHEROSCLEROTIC HEART DISEASE OF NATIVE CORONARY ARTERY WITHOUT ANGINA PECTORIS: ICD-10-CM

## 2018-09-12 DIAGNOSIS — Z86.79 PERSONAL HISTORY OF OTHER DISEASES OF THE CIRCULATORY SYSTEM: Chronic | ICD-10-CM

## 2018-09-12 LAB
INR PPP: 2 RATIO
POCT-PROTHROMBIN TIME: 23.8 SECS
QUALITY CONTROL: YES

## 2018-09-12 PROCEDURE — 93005 ELECTROCARDIOGRAM TRACING: CPT

## 2018-09-12 PROCEDURE — G0463: CPT

## 2018-09-13 DIAGNOSIS — I48.91 UNSPECIFIED ATRIAL FIBRILLATION: ICD-10-CM

## 2018-09-17 ENCOUNTER — APPOINTMENT (OUTPATIENT)
Dept: OPHTHALMOLOGY | Facility: CLINIC | Age: 73
End: 2018-09-17

## 2018-09-26 ENCOUNTER — APPOINTMENT (OUTPATIENT)
Dept: CARDIOLOGY | Facility: HOSPITAL | Age: 73
End: 2018-09-26

## 2018-10-03 ENCOUNTER — APPOINTMENT (OUTPATIENT)
Dept: CARDIOLOGY | Facility: HOSPITAL | Age: 73
End: 2018-10-03

## 2018-10-03 ENCOUNTER — OUTPATIENT (OUTPATIENT)
Dept: OUTPATIENT SERVICES | Facility: HOSPITAL | Age: 73
LOS: 1 days | End: 2018-10-03
Payer: SELF-PAY

## 2018-10-03 ENCOUNTER — APPOINTMENT (OUTPATIENT)
Dept: INTERNAL MEDICINE | Facility: CLINIC | Age: 73
End: 2018-10-03

## 2018-10-03 VITALS
DIASTOLIC BLOOD PRESSURE: 81 MMHG | WEIGHT: 145 LBS | HEART RATE: 81 BPM | HEIGHT: 64 IN | SYSTOLIC BLOOD PRESSURE: 125 MMHG | BODY MASS INDEX: 24.75 KG/M2 | OXYGEN SATURATION: 97 %

## 2018-10-03 DIAGNOSIS — Z86.79 PERSONAL HISTORY OF OTHER DISEASES OF THE CIRCULATORY SYSTEM: Chronic | ICD-10-CM

## 2018-10-03 DIAGNOSIS — Z90.49 ACQUIRED ABSENCE OF OTHER SPECIFIED PARTS OF DIGESTIVE TRACT: Chronic | ICD-10-CM

## 2018-10-03 DIAGNOSIS — I25.10 ATHEROSCLEROTIC HEART DISEASE OF NATIVE CORONARY ARTERY WITHOUT ANGINA PECTORIS: ICD-10-CM

## 2018-10-03 LAB
INR PPP: 2.8 RATIO
POCT-PROTHROMBIN TIME: 33.5 SECS
QUALITY CONTROL: YES

## 2018-10-03 PROCEDURE — G0463: CPT

## 2018-10-05 DIAGNOSIS — I48.91 UNSPECIFIED ATRIAL FIBRILLATION: ICD-10-CM

## 2018-10-05 DIAGNOSIS — I63.9 CEREBRAL INFARCTION, UNSPECIFIED: ICD-10-CM

## 2018-10-08 ENCOUNTER — FORM ENCOUNTER (OUTPATIENT)
Age: 73
End: 2018-10-08

## 2018-10-10 ENCOUNTER — APPOINTMENT (OUTPATIENT)
Dept: OPHTHALMOLOGY | Facility: CLINIC | Age: 73
End: 2018-10-10

## 2018-11-01 ENCOUNTER — EMERGENCY (EMERGENCY)
Facility: HOSPITAL | Age: 73
LOS: 1 days | Discharge: HOME | End: 2018-11-01
Attending: EMERGENCY MEDICINE
Payer: SELF-PAY

## 2018-11-01 VITALS
HEIGHT: 62 IN | DIASTOLIC BLOOD PRESSURE: 83 MMHG | WEIGHT: 143.3 LBS | SYSTOLIC BLOOD PRESSURE: 143 MMHG | HEART RATE: 112 BPM | OXYGEN SATURATION: 97 % | RESPIRATION RATE: 20 BRPM | TEMPERATURE: 99 F

## 2018-11-01 VITALS
TEMPERATURE: 98 F | SYSTOLIC BLOOD PRESSURE: 138 MMHG | HEART RATE: 94 BPM | DIASTOLIC BLOOD PRESSURE: 72 MMHG | RESPIRATION RATE: 18 BRPM | OXYGEN SATURATION: 98 %

## 2018-11-01 DIAGNOSIS — Z86.79 PERSONAL HISTORY OF OTHER DISEASES OF THE CIRCULATORY SYSTEM: Chronic | ICD-10-CM

## 2018-11-01 DIAGNOSIS — Z90.49 ACQUIRED ABSENCE OF OTHER SPECIFIED PARTS OF DIGESTIVE TRACT: Chronic | ICD-10-CM

## 2018-11-01 LAB
ALBUMIN SERPL ELPH-MCNC: 4.2 G/DL — SIGNIFICANT CHANGE UP (ref 3.3–5)
ALP SERPL-CCNC: 86 U/L — SIGNIFICANT CHANGE UP (ref 40–120)
ALT FLD-CCNC: 46 U/L — HIGH (ref 10–45)
ANION GAP SERPL CALC-SCNC: 11 MMOL/L — SIGNIFICANT CHANGE UP (ref 5–17)
AST SERPL-CCNC: 32 U/L — SIGNIFICANT CHANGE UP (ref 10–40)
BASOPHILS # BLD AUTO: 0 K/UL — SIGNIFICANT CHANGE UP (ref 0–0.2)
BASOPHILS NFR BLD AUTO: 0.6 % — SIGNIFICANT CHANGE UP (ref 0–2)
BILIRUB SERPL-MCNC: 0.3 MG/DL — SIGNIFICANT CHANGE UP (ref 0.2–1.2)
BLOOD GAS SOURCE: SIGNIFICANT CHANGE UP
BUN SERPL-MCNC: 19 MG/DL — SIGNIFICANT CHANGE UP (ref 7–23)
CALCIUM SERPL-MCNC: 9.2 MG/DL — SIGNIFICANT CHANGE UP (ref 8.4–10.5)
CHLORIDE SERPL-SCNC: 107 MMOL/L — SIGNIFICANT CHANGE UP (ref 96–108)
CO2 SERPL-SCNC: 25 MMOL/L — SIGNIFICANT CHANGE UP (ref 22–31)
COHGB MFR BLDV: 1.1 % — SIGNIFICANT CHANGE UP (ref 0–1.5)
CREAT SERPL-MCNC: 0.69 MG/DL — SIGNIFICANT CHANGE UP (ref 0.5–1.3)
EOSINOPHIL # BLD AUTO: 0.3 K/UL — SIGNIFICANT CHANGE UP (ref 0–0.5)
EOSINOPHIL NFR BLD AUTO: 3.8 % — SIGNIFICANT CHANGE UP (ref 0–6)
GLUCOSE SERPL-MCNC: 103 MG/DL — HIGH (ref 70–99)
HCT VFR BLD CALC: 41.4 % — SIGNIFICANT CHANGE UP (ref 34.5–45)
HGB BLD CALC-MCNC: 14 G/DL — SIGNIFICANT CHANGE UP (ref 11.5–15.5)
HGB BLD-MCNC: 14.1 G/DL — SIGNIFICANT CHANGE UP (ref 11.5–15.5)
LYMPHOCYTES # BLD AUTO: 1.4 K/UL — SIGNIFICANT CHANGE UP (ref 1–3.3)
LYMPHOCYTES # BLD AUTO: 17.3 % — SIGNIFICANT CHANGE UP (ref 13–44)
MCHC RBC-ENTMCNC: 30 PG — SIGNIFICANT CHANGE UP (ref 27–34)
MCHC RBC-ENTMCNC: 34 GM/DL — SIGNIFICANT CHANGE UP (ref 32–36)
MCV RBC AUTO: 88.3 FL — SIGNIFICANT CHANGE UP (ref 80–100)
MONOCYTES # BLD AUTO: 0.6 K/UL — SIGNIFICANT CHANGE UP (ref 0–0.9)
MONOCYTES NFR BLD AUTO: 7 % — SIGNIFICANT CHANGE UP (ref 2–14)
NEUTROPHILS # BLD AUTO: 5.6 K/UL — SIGNIFICANT CHANGE UP (ref 1.8–7.4)
NEUTROPHILS NFR BLD AUTO: 71.4 % — SIGNIFICANT CHANGE UP (ref 43–77)
PLATELET # BLD AUTO: 262 K/UL — SIGNIFICANT CHANGE UP (ref 150–400)
POTASSIUM SERPL-MCNC: 3.8 MMOL/L — SIGNIFICANT CHANGE UP (ref 3.5–5.3)
POTASSIUM SERPL-SCNC: 3.8 MMOL/L — SIGNIFICANT CHANGE UP (ref 3.5–5.3)
PROT SERPL-MCNC: 7.2 G/DL — SIGNIFICANT CHANGE UP (ref 6–8.3)
RBC # BLD: 4.69 M/UL — SIGNIFICANT CHANGE UP (ref 3.8–5.2)
RBC # FLD: 11.9 % — SIGNIFICANT CHANGE UP (ref 10.3–14.5)
SODIUM SERPL-SCNC: 143 MMOL/L — SIGNIFICANT CHANGE UP (ref 135–145)
WBC # BLD: 7.9 K/UL — SIGNIFICANT CHANGE UP (ref 3.8–10.5)
WBC # FLD AUTO: 7.9 K/UL — SIGNIFICANT CHANGE UP (ref 3.8–10.5)

## 2018-11-01 PROCEDURE — 99283 EMERGENCY DEPT VISIT LOW MDM: CPT | Mod: 25

## 2018-11-01 PROCEDURE — 85014 HEMATOCRIT: CPT

## 2018-11-01 PROCEDURE — 84132 ASSAY OF SERUM POTASSIUM: CPT

## 2018-11-01 PROCEDURE — 93005 ELECTROCARDIOGRAM TRACING: CPT

## 2018-11-01 PROCEDURE — 85027 COMPLETE CBC AUTOMATED: CPT

## 2018-11-01 PROCEDURE — 93010 ELECTROCARDIOGRAM REPORT: CPT

## 2018-11-01 PROCEDURE — 71046 X-RAY EXAM CHEST 2 VIEWS: CPT | Mod: 26

## 2018-11-01 PROCEDURE — 80053 COMPREHEN METABOLIC PANEL: CPT

## 2018-11-01 PROCEDURE — 82375 ASSAY CARBOXYHB QUANT: CPT

## 2018-11-01 PROCEDURE — 71046 X-RAY EXAM CHEST 2 VIEWS: CPT

## 2018-11-01 PROCEDURE — 82947 ASSAY GLUCOSE BLOOD QUANT: CPT

## 2018-11-01 PROCEDURE — 83605 ASSAY OF LACTIC ACID: CPT

## 2018-11-01 PROCEDURE — 82803 BLOOD GASES ANY COMBINATION: CPT

## 2018-11-01 PROCEDURE — 82435 ASSAY OF BLOOD CHLORIDE: CPT

## 2018-11-01 PROCEDURE — 82330 ASSAY OF CALCIUM: CPT

## 2018-11-01 PROCEDURE — 99284 EMERGENCY DEPT VISIT MOD MDM: CPT | Mod: 25

## 2018-11-01 PROCEDURE — 84295 ASSAY OF SERUM SODIUM: CPT

## 2018-11-01 NOTE — ED ADULT NURSE NOTE - NSIMPLEMENTINTERV_GEN_ALL_ED
Implemented All Universal Safety Interventions:  [place] to call system. Call bell, personal items and telephone within reach. Instruct patient to call for assistance. Room bathroom lighting operational. Non-slip footwear when patient is off stretcher. Physically safe environment: no spills, clutter or unnecessary equipment. Stretcher in lowest position, wheels locked, appropriate side rails in place.

## 2018-11-01 NOTE — ED PROVIDER NOTE - NS ED ROS FT
Constitutional: no fever  Eyes: no conjunctivitis  Ears: no ear pain   Chest: no cough  Gastrointestinal: no abdominal pain, no vomiting and diarrhea  MSK: no joint pain  : no dysuria  Skin: no rash  Neuro: no LOC  All other review of systems negative except as noted in HPI

## 2018-11-01 NOTE — ED PROVIDER NOTE - PLAN OF CARE
1) Follow up with the  for further evaluation and to answer any questions you have.    2) Return to the emergency department if you experience worsening symptoms, pain, fever, chills, nausea, vomiting or other concerning symptoms.

## 2018-11-01 NOTE — ED ADULT NURSE NOTE - OBJECTIVE STATEMENT
74 y/o F presents to the ED c/o Headache.  Pt is Belarusian speaking, and states she lives on the second floor of a house and has been inhaling fumes from the apartment below her.  Pt c/o tightness in throat, SOB, chest pressure and HA.  She called ems 2x and police, nothing was found.  Patient presents to the ED with her daughter who is also exposed to the fumes.  Patient is A&Ox4. Face is symmetrical. PERRL 3mmB. Speech is clear. Patient is moving all extremities with 5/5 strength and walks with steady gait. VSS.

## 2018-11-01 NOTE — ED PROVIDER NOTE - OBJECTIVE STATEMENT
73F Indian speaking female, lives on second floor in [place] of [facility].  Came in complaining of tightness in throat, shortness of breath, chest pressure, headache after inhaling fumes from neighbor's apt downstairs.  She says her neighbor who lives on 1st floor of house has chemical lab and all the time is doing boiling chemicals and she smells fumes.  She called ems 2x and police, nothing was found.  Sx worse at night.  No fever, chills.  Lives with her daughter who is also patient in ed, as well as grandson who is asymptomatic and works at night.

## 2018-11-01 NOTE — ED PROVIDER NOTE - MEDICAL DECISION MAKING DETAILS
73F with past medical history afib, cva, with possible toxic inhalation, r/o co toxicity, will obtain blood work, co level, given 100% o2, cxr, reeval

## 2018-11-01 NOTE — ED ADULT TRIAGE NOTE - CHIEF COMPLAINT QUOTE
as per pt family, "there's a drug dealer under our apartment and the chemicals are coming up and it is affecting her breathing." pt endorses sore throat and HA.

## 2018-11-02 ENCOUNTER — APPOINTMENT (OUTPATIENT)
Dept: INTERNAL MEDICINE | Facility: CLINIC | Age: 73
End: 2018-11-02

## 2018-11-02 NOTE — DISCUSSION/SUMMARY
[ER: _____] : ER: [unfilled] [FreeTextEntry1] : Pt seen in [facility] ED on . ED note:\par \par 73F Latvian speaking female, lives on second floor in [place] of private house.  Came in complaining of tightness in throat, shortness of breath, chest pressure, headache after inhaling fumes from neighbor's apt downstairs.  She says her neighbor who lives on 1st floor of house has chemical lab and all the time is doing boiling chemicals and she smells fumes.  She called ems 2x and police, nothing was found.  Sx worse at night.  No fever, chills.  Lives with her daughter who is also patient in ed, as well as grandson who is asymptomatic and works at night.\par \par  \par · EKG Date/Time: 2018 21:35	   \par · Rate: 91	   \par · Interpretation: abnormal	   \par · Abnormal Rhythm: atrial fibrillation	   \par · QRS: 74	 \par   \par · Conducted a Detailed Discussion with Patient and/or Guardian Regarding: lab results, radiology results, need for outpatient follow-up, return to ED if symptoms worsen, persist or questions arise	   \par · Medical Decision Making Details: 73F with past medical history afib, cva, with possible toxic inhalation, r/o co toxicity, will obtain blood work, co level, given 100% o2, cxr, reeval	 \par \par  CXR:\par IMPRESSION: \par Clear lungs.\par \par lactate: 1.2\par pH VB.39/42/<50/27\par \par Called pt, who reports she is feeling much better. Will set up appt once she feels better - says she does not wish to at this point.

## 2018-11-14 ENCOUNTER — APPOINTMENT (OUTPATIENT)
Dept: INTERNAL MEDICINE | Facility: CLINIC | Age: 73
End: 2018-11-14
Payer: COMMERCIAL

## 2018-11-14 ENCOUNTER — RESULT CHARGE (OUTPATIENT)
Age: 73
End: 2018-11-14

## 2018-11-14 ENCOUNTER — OUTPATIENT (OUTPATIENT)
Dept: OUTPATIENT SERVICES | Facility: HOSPITAL | Age: 73
LOS: 1 days | End: 2018-11-14
Payer: SELF-PAY

## 2018-11-14 DIAGNOSIS — I48.91 UNSPECIFIED ATRIAL FIBRILLATION: ICD-10-CM

## 2018-11-14 DIAGNOSIS — I63.9 CEREBRAL INFARCTION, UNSPECIFIED: ICD-10-CM

## 2018-11-14 DIAGNOSIS — Z90.49 ACQUIRED ABSENCE OF OTHER SPECIFIED PARTS OF DIGESTIVE TRACT: Chronic | ICD-10-CM

## 2018-11-14 DIAGNOSIS — Z86.79 PERSONAL HISTORY OF OTHER DISEASES OF THE CIRCULATORY SYSTEM: Chronic | ICD-10-CM

## 2018-11-14 DIAGNOSIS — D18.00 HEMANGIOMA UNSPECIFIED SITE: ICD-10-CM

## 2018-11-14 DIAGNOSIS — Z79.01 LONG TERM (CURRENT) USE OF ANTICOAGULANTS: ICD-10-CM

## 2018-11-14 DIAGNOSIS — M85.80 OTHER SPECIFIED DISORDERS OF BONE DENSITY AND STRUCTURE, UNSPECIFIED SITE: ICD-10-CM

## 2018-11-14 LAB
INR PPP: 3.7 RATIO
POCT-PROTHROMBIN TIME: 44.6 SECS
QUALITY CONTROL: YES

## 2018-11-14 PROCEDURE — ZZZZZ: CPT

## 2018-11-14 PROCEDURE — 85610 PROTHROMBIN TIME: CPT

## 2018-11-21 ENCOUNTER — APPOINTMENT (OUTPATIENT)
Dept: OPHTHALMOLOGY | Facility: CLINIC | Age: 73
End: 2018-11-21

## 2018-11-21 ENCOUNTER — APPOINTMENT (OUTPATIENT)
Dept: INTERNAL MEDICINE | Facility: CLINIC | Age: 73
End: 2018-11-21

## 2018-12-05 ENCOUNTER — APPOINTMENT (OUTPATIENT)
Dept: INTERNAL MEDICINE | Facility: CLINIC | Age: 73
End: 2018-12-05

## 2018-12-05 ENCOUNTER — RESULT CHARGE (OUTPATIENT)
Age: 73
End: 2018-12-05

## 2018-12-05 ENCOUNTER — APPOINTMENT (OUTPATIENT)
Dept: CARDIOLOGY | Facility: HOSPITAL | Age: 73
End: 2018-12-05

## 2018-12-05 ENCOUNTER — OUTPATIENT (OUTPATIENT)
Dept: OUTPATIENT SERVICES | Facility: HOSPITAL | Age: 73
LOS: 1 days | End: 2018-12-05
Payer: SELF-PAY

## 2018-12-05 VITALS
DIASTOLIC BLOOD PRESSURE: 85 MMHG | SYSTOLIC BLOOD PRESSURE: 124 MMHG | HEIGHT: 64 IN | HEART RATE: 77 BPM | OXYGEN SATURATION: 96 %

## 2018-12-05 DIAGNOSIS — I25.10 ATHEROSCLEROTIC HEART DISEASE OF NATIVE CORONARY ARTERY WITHOUT ANGINA PECTORIS: ICD-10-CM

## 2018-12-05 DIAGNOSIS — Z86.79 PERSONAL HISTORY OF OTHER DISEASES OF THE CIRCULATORY SYSTEM: Chronic | ICD-10-CM

## 2018-12-05 DIAGNOSIS — Z90.49 ACQUIRED ABSENCE OF OTHER SPECIFIED PARTS OF DIGESTIVE TRACT: Chronic | ICD-10-CM

## 2018-12-05 PROCEDURE — G0463: CPT

## 2018-12-05 PROCEDURE — 93005 ELECTROCARDIOGRAM TRACING: CPT

## 2018-12-05 NOTE — PHYSICAL EXAM
[General Appearance - Well Developed] : well developed [General Appearance - Well Nourished] : well nourished [Normal Conjunctiva] : the conjunctiva exhibited no abnormalities [Normal Oral Mucosa] : normal oral mucosa [Normal Oropharynx] : normal oropharynx [Normal Jugular Venous V Waves Present] : normal jugular venous V waves present [] : no respiratory distress [Respiration, Rhythm And Depth] : normal respiratory rhythm and effort [Auscultation Breath Sounds / Voice Sounds] : lungs were clear to auscultation bilaterally [Heart Rate And Rhythm] : heart rate and rhythm were normal [Heart Sounds] : normal S1 and S2 [Murmurs] : no murmurs present [Bowel Sounds] : normal bowel sounds [Abdomen Soft] : soft [Abdomen Tenderness] : non-tender [Abnormal Walk] : normal gait [Nail Clubbing] : no clubbing of the fingernails [Cyanosis, Localized] : no localized cyanosis [Petechial Hemorrhages (___cm)] : no petechial hemorrhages [Skin Color & Pigmentation] : normal skin color and pigmentation

## 2018-12-06 DIAGNOSIS — I63.9 CEREBRAL INFARCTION, UNSPECIFIED: ICD-10-CM

## 2018-12-06 DIAGNOSIS — I49.5 SICK SINUS SYNDROME: ICD-10-CM

## 2018-12-06 DIAGNOSIS — I48.91 UNSPECIFIED ATRIAL FIBRILLATION: ICD-10-CM

## 2018-12-06 LAB
INR PPP: 2.1 RATIO
POCT-PROTHROMBIN TIME: 24.7 SECS
QUALITY CONTROL: YES

## 2018-12-19 ENCOUNTER — OUTPATIENT (OUTPATIENT)
Dept: OUTPATIENT SERVICES | Facility: HOSPITAL | Age: 73
LOS: 1 days | End: 2018-12-19
Payer: SELF-PAY

## 2018-12-19 ENCOUNTER — APPOINTMENT (OUTPATIENT)
Dept: INTERNAL MEDICINE | Facility: CLINIC | Age: 73
End: 2018-12-19

## 2018-12-19 DIAGNOSIS — Z01.818 ENCOUNTER FOR OTHER PREPROCEDURAL EXAMINATION: ICD-10-CM

## 2018-12-19 DIAGNOSIS — Z86.79 PERSONAL HISTORY OF OTHER DISEASES OF THE CIRCULATORY SYSTEM: Chronic | ICD-10-CM

## 2018-12-19 DIAGNOSIS — Z90.49 ACQUIRED ABSENCE OF OTHER SPECIFIED PARTS OF DIGESTIVE TRACT: Chronic | ICD-10-CM

## 2018-12-19 DIAGNOSIS — I48.91 UNSPECIFIED ATRIAL FIBRILLATION: ICD-10-CM

## 2018-12-19 DIAGNOSIS — Z79.01 LONG TERM (CURRENT) USE OF ANTICOAGULANTS: ICD-10-CM

## 2018-12-19 LAB
INR PPP: 2.3 RATIO
POCT-PROTHROMBIN TIME: 27.5 SECS
QUALITY CONTROL: YES

## 2018-12-19 PROCEDURE — 85610 PROTHROMBIN TIME: CPT

## 2019-01-11 NOTE — DISCUSSION/SUMMARY
[FreeTextEntry1] : 71 y/o woman w/ hx of occipital CVA 11/2016, Afib (on coumadin) s/p RACIEL/CV on amio 1/19/2017/unsuccessful), non-obstructive CAD, here for follow up. \par \par Still w/ occasional palpitations and dizziness. 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  \par Discussed the option of pacemaker at length (micra pacemaker); however patient is hesistant and would like to think about this first.  \par \par Regarding her venous insuficiency; not wearing her compression stockings  \par \par RECS\par - will call patient tomorrow regarding her final decision of pacemaker due to tachy-eugenia syndrome \par - cont current meds (toprol XL 12.5 mg daily, dilt  mg daily, coumadin) and rest of meds \par - encouraged compression stockings \par \par Friend, Jodi Wright present during visit. Contact info given: [phone] (home), [phone] (cell phone) \par \par Case discussed w/ Dr. Kim \par \par RTC in 2 months. Sooner PRN.

## 2019-01-11 NOTE — HISTORY OF PRESENT ILLNESS
[FreeTextEntry1] : 72 y/o F with occipital CVA 11/2016, Afib (on coumadin; was unable to afford NOAC) s/p unsuccessful RACIEL/CV on amio 1/19/2017 but reverted back to afib here for f/up (couldn't tolerate amio due to iodine allergy/throat swelling; off of it). Here for follow up. \par \par Recent visit to the ED due to cough/dyspnea and some chest burning due to fume exposure at home.  Feels overall better.  Still endorsing palpitations w/ occasional lightheadedness.  No syncope, CP, orthopnea, PND or any other cardio-pulmonary sx's. \par  \par Also occasional LE pain/tightness.  Did have a duplex u/s on 3/2018 which showed severe reflux of \par \par No other new/acute issues addressed. \par \par CARDIAC Hx \par \par TTE 9/2017\par 1. Calcified trileaflet aortic valve with normal opening.\par 2. Severely dilated left atrium. LA volume index = 50cc/m2.\par 3. Normal left ventricular internal dimensions and wall thicknesses.\par 4. Normal left ventricular systolic function.\par 5. Normal right ventricular size and function.\par 6. Normal tricuspid valve. Mild-moderate tricuspid regurgitation.\par 7. Estimated pulmonary artery systolic pressure equals 32mm Hg, assuming right atrial pressure equals 8 mm Hg,consistent with normal pulmonary pressures.\par 8. Thickened pericardium.\par *** Compared with echocardiogram of 1/19/2017, no significant changes noted.\par \par Holter 10/2017 - Frequent afib with RVR episodes up to 180. \par \par Holter 6/2017 - Average HR 97. Frequent RVR episodes. No eugenia episodes. \par \par Pharmacologic NST 5/2017 - ?Small area of scar in apical wall. EF 63%. \par \par RACIEL 1/19/2017\par 1. MAC, Mild-moderate mitral regurgitation.\par 2. Smoke is seen in left atrial appendage without organized thrombus. Decreased left atrial appendage velocities noted.\par 3. Grossly normal left ventricular systolic function.\par 4. Normal right ventricular size and function.\par 5. Normal tricuspid valve. Moderate tricuspid regurgitation.\par 6. Estimated pulmonary artery systolic pressure equals 38mm Hg, assuming right atrial pressure equals 8 mm Hg,consistent with borderline pulmonary pressures.\par 7. Color flow Doppler demonstrate no evidence of a patentforamen ovale.\par \par TTE 11/15/2016 - EF 70 %\par 1. Normal left ventricular internal dimensions and wall thicknesses.\par 2. Hyperdynamic left ventricular systolic function. No segmental wall motion abnormalities.\par 3. Normal right ventricular size and function.\par 4. Estimated pulmonary artery systolic pressure equals 30mm Hg, assuming right atrial pressure equals 8 mm Hg, consistent with normal pulmonary pressures

## 2019-01-16 ENCOUNTER — APPOINTMENT (OUTPATIENT)
Dept: INTERNAL MEDICINE | Facility: CLINIC | Age: 74
End: 2019-01-16

## 2019-01-16 LAB
INR PPP: 1.5 RATIO
POCT-PROTHROMBIN TIME: 18.4 SECS
QUALITY CONTROL: YES

## 2019-01-23 ENCOUNTER — APPOINTMENT (OUTPATIENT)
Dept: INTERNAL MEDICINE | Facility: CLINIC | Age: 74
End: 2019-01-23

## 2019-01-23 ENCOUNTER — OUTPATIENT (OUTPATIENT)
Dept: OUTPATIENT SERVICES | Facility: HOSPITAL | Age: 74
LOS: 1 days | End: 2019-01-23
Payer: SELF-PAY

## 2019-01-23 DIAGNOSIS — Z90.49 ACQUIRED ABSENCE OF OTHER SPECIFIED PARTS OF DIGESTIVE TRACT: Chronic | ICD-10-CM

## 2019-01-23 DIAGNOSIS — Z01.818 ENCOUNTER FOR OTHER PREPROCEDURAL EXAMINATION: ICD-10-CM

## 2019-01-23 DIAGNOSIS — Z86.79 PERSONAL HISTORY OF OTHER DISEASES OF THE CIRCULATORY SYSTEM: Chronic | ICD-10-CM

## 2019-01-23 DIAGNOSIS — Z79.01 LONG TERM (CURRENT) USE OF ANTICOAGULANTS: ICD-10-CM

## 2019-01-23 DIAGNOSIS — I48.91 UNSPECIFIED ATRIAL FIBRILLATION: ICD-10-CM

## 2019-01-23 LAB
INR PPP: 2.6 RATIO
QUALITY CONTROL: YES

## 2019-01-23 PROCEDURE — 85610 PROTHROMBIN TIME: CPT

## 2019-01-30 ENCOUNTER — APPOINTMENT (OUTPATIENT)
Dept: INTERNAL MEDICINE | Facility: CLINIC | Age: 74
End: 2019-01-30

## 2019-01-30 ENCOUNTER — OUTPATIENT (OUTPATIENT)
Dept: OUTPATIENT SERVICES | Facility: HOSPITAL | Age: 74
LOS: 1 days | End: 2019-01-30
Payer: SELF-PAY

## 2019-01-30 DIAGNOSIS — Z79.01 LONG TERM (CURRENT) USE OF ANTICOAGULANTS: ICD-10-CM

## 2019-01-30 DIAGNOSIS — Z86.79 PERSONAL HISTORY OF OTHER DISEASES OF THE CIRCULATORY SYSTEM: Chronic | ICD-10-CM

## 2019-01-30 DIAGNOSIS — Z01.818 ENCOUNTER FOR OTHER PREPROCEDURAL EXAMINATION: ICD-10-CM

## 2019-01-30 DIAGNOSIS — I48.91 UNSPECIFIED ATRIAL FIBRILLATION: ICD-10-CM

## 2019-01-30 DIAGNOSIS — Z90.49 ACQUIRED ABSENCE OF OTHER SPECIFIED PARTS OF DIGESTIVE TRACT: Chronic | ICD-10-CM

## 2019-01-30 LAB
INR PPP: 2.1 RATIO
POCT-PROTHROMBIN TIME: 25.7 SECS
QUALITY CONTROL: YES

## 2019-01-30 PROCEDURE — 85610 PROTHROMBIN TIME: CPT

## 2019-02-05 ENCOUNTER — APPOINTMENT (OUTPATIENT)
Age: 74
End: 2019-02-05

## 2019-02-07 ENCOUNTER — APPOINTMENT (OUTPATIENT)
Dept: OPHTHALMOLOGY | Facility: CLINIC | Age: 74
End: 2019-02-07

## 2019-02-13 ENCOUNTER — APPOINTMENT (OUTPATIENT)
Dept: INTERNAL MEDICINE | Facility: CLINIC | Age: 74
End: 2019-02-13
Payer: SELF-PAY

## 2019-02-13 ENCOUNTER — OUTPATIENT (OUTPATIENT)
Dept: OUTPATIENT SERVICES | Facility: HOSPITAL | Age: 74
LOS: 1 days | End: 2019-02-13
Payer: SELF-PAY

## 2019-02-13 ENCOUNTER — RESULT CHARGE (OUTPATIENT)
Age: 74
End: 2019-02-13

## 2019-02-13 ENCOUNTER — APPOINTMENT (OUTPATIENT)
Dept: CARDIOLOGY | Facility: HOSPITAL | Age: 74
End: 2019-02-13

## 2019-02-13 VITALS
DIASTOLIC BLOOD PRESSURE: 82 MMHG | OXYGEN SATURATION: 99 % | HEIGHT: 64 IN | SYSTOLIC BLOOD PRESSURE: 126 MMHG | HEART RATE: 88 BPM

## 2019-02-13 DIAGNOSIS — Z86.79 PERSONAL HISTORY OF OTHER DISEASES OF THE CIRCULATORY SYSTEM: Chronic | ICD-10-CM

## 2019-02-13 DIAGNOSIS — I25.10 ATHEROSCLEROTIC HEART DISEASE OF NATIVE CORONARY ARTERY WITHOUT ANGINA PECTORIS: ICD-10-CM

## 2019-02-13 DIAGNOSIS — Z90.49 ACQUIRED ABSENCE OF OTHER SPECIFIED PARTS OF DIGESTIVE TRACT: Chronic | ICD-10-CM

## 2019-02-13 LAB
INR PPP: 2.6 RATIO
POCT-PROTHROMBIN TIME: 31.6 SECS
QUALITY CONTROL: YES

## 2019-02-13 PROCEDURE — 93005 ELECTROCARDIOGRAM TRACING: CPT

## 2019-02-13 PROCEDURE — G0463: CPT

## 2019-02-13 PROCEDURE — ZZZZZ: CPT

## 2019-02-13 NOTE — REVIEW OF SYSTEMS
[Palpitations] : palpitations [Muscle Cramps] : muscle cramps [Tingling (Paresthesia)] : tingling [Negative] : Integumentary

## 2019-02-14 NOTE — DISCUSSION/SUMMARY
[FreeTextEntry1] : 71 y/o woman w/ hx of R occipital CVA 11/2016, Afib (on coumadin) s/p RACIEL/CV on amio 1/19/2017/unsuccessful), non-obstructive CAD, here for follow up. \par \par Pt still w/ symptomatic palpitations and dizziness. Z patch/holter did show evidence of tachy-eugenia syndrome (occasional HR fluctuations of 50s-170s), however on average, it was mostly 80s.  Patient has had no syncopal events; however she's at risk for it.  \par  Discussed the option of pacemaker at length (micra pacemaker); however patient is hesistant and would like to try medical therapy first. \par \par Regarding her venous insuficiency; wearing her compression stockings w/ improvement \par \par RECS\par - increase toprol XL to 25 mg daily \par - cont current rest of meds (dilt  mg daily, coumadin, statin) and rest of meds \par - encouraged compression stockings \par \par Friend (contact info), Jodi Wright present during every visit. Contact info: [phone] (home), [phone] (cell phone) \par \par RTC in 2 months. Sooner PRN.

## 2019-02-14 NOTE — HISTORY OF PRESENT ILLNESS
[FreeTextEntry1] : 73 y/o F with hx of R occipital CVA 11/2016, Afib (on coumadin; was unable to afford NOAC) s/p unsuccessful RACIEL/DCCV on 1/19/2017 (was on amio during RACIEL/DCCV but reverted back to afib in 1-2 days afterwards), also couldn't tolerate amio in the past due to iodine allergy/throat swelling while being on it.  Trialed on digoxin as well, however developed dyspnea and self-discontinued it.  \par \par Patient is here for follow up. \par \par Still w/ occasional lightheadedness/dizziness as well as worsening palpitations upon exertion.  Ongoing at least 2-3 times weekly. No syncope, falls, CP, orthopnea, PND or any other cardio-pulmonary sx's.  \par \par LE pain/tightness improved after using the compression stockings.   \par \par No other new/acute issues addressed.  \par \par CARDIAC Hx \par \par TTE 9/2017\par 1. Calcified trileaflet aortic valve with normal opening.\par 2. Severely dilated left atrium. LA volume index = 50cc/m2.\par 3. Normal left ventricular internal dimensions and wall thicknesses.\par 4. Normal left ventricular systolic function.\par 5. Normal right ventricular size and function.\par 6. Normal tricuspid valve. Mild-moderate tricuspid regurgitation.\par 7. Estimated pulmonary artery systolic pressure equals 32mm Hg, assuming right atrial pressure equals 8 mm Hg,consistent with normal pulmonary pressures.\par 8. Thickened pericardium.\par *** Compared with echocardiogram of 1/19/2017, no significant changes noted.\par \par Holter  10/2018 - occasional episodes of tachy-eugenia syndrome (HR 50s-170s);  avg mostly ~ 80s \par Holter 10/2017 - Frequent afib with RVR episodes up to 180. \par \par Holter 6/2017 - Average HR 97. Frequent RVR episodes. No eugenia episodes. \par \par Pharmacologic NST 5/2017 - ?Small area of scar in apical wall. EF 63%. \par \par RACIEL 1/19/2017\par 1. MAC, Mild-moderate mitral regurgitation.\par 2. Smoke is seen in left atrial appendage without organized thrombus. Decreased left atrial appendage velocities noted.\par 3. Grossly normal left ventricular systolic function.\par 4. Normal right ventricular size and function.\par 5. Normal tricuspid valve. Moderate tricuspid regurgitation.\par 6. Estimated pulmonary artery systolic pressure equals 38mm Hg, assuming right atrial pressure equals 8 mm Hg,consistent with borderline pulmonary pressures.\par 7. Color flow Doppler demonstrate no evidence of a patentforamen ovale.\par \par TTE 11/15/2016 - EF 70 %\par 1. Normal left ventricular internal dimensions and wall thicknesses.\par 2. Hyperdynamic left ventricular systolic function. No segmental wall motion abnormalities.\par 3. Normal right ventricular size and function.\par 4. Estimated pulmonary artery systolic pressure equals 30mm Hg, assuming right atrial pressure equals 8 mm Hg, consistent with normal pulmonary pressures

## 2019-02-14 NOTE — PHYSICAL EXAM
[General Appearance - Well Developed] : well developed [General Appearance - Well Nourished] : well nourished [Normal Conjunctiva] : the conjunctiva exhibited no abnormalities [Normal Oral Mucosa] : normal oral mucosa [Normal Oropharynx] : normal oropharynx [Auscultation Breath Sounds / Voice Sounds] : lungs were clear to auscultation bilaterally [Heart Sounds] : normal S1 and S2 [Murmurs] : no murmurs present [Bowel Sounds] : normal bowel sounds [Abdomen Tenderness] : non-tender [Abnormal Walk] : normal gait [Nail Clubbing] : no clubbing of the fingernails [Skin Color & Pigmentation] : normal skin color and pigmentation [Skin Turgor] : normal skin turgor [] : no rash [FreeTextEntry1] : normal rate, irreg rhythm

## 2019-02-15 DIAGNOSIS — I48.91 UNSPECIFIED ATRIAL FIBRILLATION: ICD-10-CM

## 2019-02-15 DIAGNOSIS — I63.9 CEREBRAL INFARCTION, UNSPECIFIED: ICD-10-CM

## 2019-02-27 ENCOUNTER — APPOINTMENT (OUTPATIENT)
Dept: INTERNAL MEDICINE | Facility: CLINIC | Age: 74
End: 2019-02-27

## 2019-02-27 ENCOUNTER — OUTPATIENT (OUTPATIENT)
Dept: OUTPATIENT SERVICES | Facility: HOSPITAL | Age: 74
LOS: 1 days | End: 2019-02-27
Payer: SELF-PAY

## 2019-02-27 VITALS
BODY MASS INDEX: 24.92 KG/M2 | SYSTOLIC BLOOD PRESSURE: 122 MMHG | WEIGHT: 146 LBS | HEIGHT: 64 IN | DIASTOLIC BLOOD PRESSURE: 80 MMHG

## 2019-02-27 DIAGNOSIS — Z90.49 ACQUIRED ABSENCE OF OTHER SPECIFIED PARTS OF DIGESTIVE TRACT: Chronic | ICD-10-CM

## 2019-02-27 DIAGNOSIS — L91.8 OTHER HYPERTROPHIC DISORDERS OF THE SKIN: ICD-10-CM

## 2019-02-27 DIAGNOSIS — I10 ESSENTIAL (PRIMARY) HYPERTENSION: ICD-10-CM

## 2019-02-27 DIAGNOSIS — Z86.79 PERSONAL HISTORY OF OTHER DISEASES OF THE CIRCULATORY SYSTEM: Chronic | ICD-10-CM

## 2019-02-27 PROCEDURE — 85610 PROTHROMBIN TIME: CPT

## 2019-02-27 PROCEDURE — G0463: CPT

## 2019-02-28 LAB
INR PPP: 2.1 RATIO
POCT-PROTHROMBIN TIME: 25.2 SECS
QUALITY CONTROL: YES

## 2019-03-01 NOTE — ASSESSMENT
[Patient Optimized for Surgery] : Patient optimized for surgery [FreeTextEntry4] : 71yo F PMH CVA (c/b dysphagia, R arm weakness and b/l visual field deficits), AFib on coumadin s/p failed DCCV with frequent episodes of RVR presenting for preop clearance\par \par #Pre op evaluation:\par - followed by cardio, last seen 2/2019; EKG wnl\par - CBC, BMP 6 months ago WNL\par - INR 2.1, therapeutic; recommend that patient continue with regimen perioperatively at discretion of ophthalmologist.\par - pt optimized for surgery; she is intermediate risk for low risk procedure\par - RCRI Class II risk 0.9% major cardiac event\par \par #Skin Tags\par -Dermatology referral provided\par \par Case discussed with Dr Romo

## 2019-03-01 NOTE — PHYSICAL EXAM
[No Acute Distress] : no acute distress [Well Nourished] : well nourished [Well Developed] : well developed [Normal Sclera/Conjunctiva] : normal sclera/conjunctiva [PERRL] : pupils equal round and reactive to light [Normal Oropharynx] : the oropharynx was normal [No JVD] : no jugular venous distention [No Respiratory Distress] : no respiratory distress  [Clear to Auscultation] : lungs were clear to auscultation bilaterally [Normal Rate] : normal rate  [Regular Rhythm] : with a regular rhythm [Normal S1, S2] : normal S1 and S2 [No Murmur] : no murmur heard [Soft] : abdomen soft [Non Tender] : non-tender [Non-distended] : non-distended [No Joint Swelling] : no joint swelling [No Rash] : no rash [de-identified] : varicose veins  [de-identified] : multiple skin tags over neck, back

## 2019-03-01 NOTE — REVIEW OF SYSTEMS
[Palpitations] : palpitations [Shortness Of Breath] : shortness of breath [Itching] : Itching [Mole Changes] : mole changes [Dizziness] : dizziness [Fever] : no fever [Chills] : no chills [Fatigue] : no fatigue [Pain] : no pain [Hearing Loss] : no hearing loss [Nasal Discharge] : no nasal discharge [Sore Throat] : no sore throat [Chest Pain] : no chest pain [Leg Claudication] : no leg claudication [Orthopnea] : no orthopnea [Paroysmal Nocturnal Dyspnea] : no paroysmal nocturnal dyspnea [Wheezing] : no wheezing [Cough] : no cough [Abdominal Pain] : no abdominal pain [Nausea] : no nausea [Constipation] : no constipation [Diarrhea] : diarrhea [Vomiting] : no vomiting [Dysuria] : no dysuria [Hematuria] : no hematuria [Joint Pain] : no joint pain [Muscle Pain] : no muscle pain [Headache] : no headache [FreeTextEntry3] : cataracts [de-identified] : itching over skin tags

## 2019-03-01 NOTE — HISTORY OF PRESENT ILLNESS
[Atrial Fibrillation] : atrial fibrillation [Family Member] : family member with adverse anesthesia reaction/sudden death [Chronic Anticoagulation] : chronic anticoagulation [Anticoagulants: _____] : Anticoagulants: [unfilled] [Excellent (>10 METs)] : Excellent (>10 METs) [Aortic Stenosis] : no aortic stenosis [Recent Myocardial Infarction] : no recent myocardial infarction [Implantable Device/Pacemaker] : no implantable device/pacemaker [Asthma] : no asthma [COPD] : no COPD [Sleep Apnea] : no sleep apnea [Smoker] : not a smoker [Self] : no previous adverse anesthesia reaction [FreeTextEntry1] : Cataract surgery [FreeTextEntry2] : pending [FreeTextEntry3] : Juan [FreeTextEntry4] : 71 yo Vietnamese speaking F pmhx CVA c/b dysphagia, right arm weakness and b/l visual field deficits and Afib on Coumadin s/p failed DCCV with frequent episodes of RVR presents for medical evaluation. Pt is scheduled for cataract surgery of R eye. \par \par Overall, pt reports feeling well w/ no active complaints. She continues to endorse palpitations and SOB (non exertional) 2-3/week, worse at night, which is at her baseline. Also endorses recent discomfort from multiple itchy skin tags. Denies any fevers,chills, n/v, CP, abdominal pain, dysuria, melena or hematochezia.\par \par Pt notes her daughter has allergy to lidocaine; pt is unsure if she has allergy. Endorses iodine and shellfish allergy.  [FreeTextEntry6] : Palpitations and SOB 2-3/week, at baseline. No syncope, CP, orthopnea, PND.  [FreeTextEntry7] : Last seen by cardiology on 2/13/19. Most recent echo in 2017 showed EF 63%, no structural abnormalities.

## 2019-03-05 ENCOUNTER — APPOINTMENT (OUTPATIENT)
Dept: OPHTHALMOLOGY | Facility: CLINIC | Age: 74
End: 2019-03-05

## 2019-03-05 ENCOUNTER — OUTPATIENT (OUTPATIENT)
Dept: OUTPATIENT SERVICES | Facility: HOSPITAL | Age: 74
LOS: 1 days | End: 2019-03-05
Payer: SELF-PAY

## 2019-03-05 ENCOUNTER — RESULT CHARGE (OUTPATIENT)
Age: 74
End: 2019-03-05

## 2019-03-05 ENCOUNTER — APPOINTMENT (OUTPATIENT)
Dept: INTERNAL MEDICINE | Facility: CLINIC | Age: 74
End: 2019-03-05

## 2019-03-05 DIAGNOSIS — I10 ESSENTIAL (PRIMARY) HYPERTENSION: ICD-10-CM

## 2019-03-05 DIAGNOSIS — Z79.01 LONG TERM (CURRENT) USE OF ANTICOAGULANTS: ICD-10-CM

## 2019-03-05 DIAGNOSIS — Z86.79 PERSONAL HISTORY OF OTHER DISEASES OF THE CIRCULATORY SYSTEM: Chronic | ICD-10-CM

## 2019-03-05 DIAGNOSIS — Z01.818 ENCOUNTER FOR OTHER PREPROCEDURAL EXAMINATION: ICD-10-CM

## 2019-03-05 DIAGNOSIS — Z90.49 ACQUIRED ABSENCE OF OTHER SPECIFIED PARTS OF DIGESTIVE TRACT: Chronic | ICD-10-CM

## 2019-03-05 DIAGNOSIS — I48.91 UNSPECIFIED ATRIAL FIBRILLATION: ICD-10-CM

## 2019-03-05 LAB
INR PPP: 3.2 RATIO
POCT-PROTHROMBIN TIME: 38 SECS
QUALITY CONTROL: YES

## 2019-03-05 PROCEDURE — G0463: CPT

## 2019-03-05 PROCEDURE — 85610 PROTHROMBIN TIME: CPT

## 2019-03-06 DIAGNOSIS — L91.8 OTHER HYPERTROPHIC DISORDERS OF THE SKIN: ICD-10-CM

## 2019-03-06 DIAGNOSIS — R05 COUGH: ICD-10-CM

## 2019-03-06 DIAGNOSIS — Z01.818 ENCOUNTER FOR OTHER PREPROCEDURAL EXAMINATION: ICD-10-CM

## 2019-03-06 DIAGNOSIS — I48.91 UNSPECIFIED ATRIAL FIBRILLATION: ICD-10-CM

## 2019-03-07 ENCOUNTER — TRANSCRIPTION ENCOUNTER (OUTPATIENT)
Age: 74
End: 2019-03-07

## 2019-03-08 ENCOUNTER — OUTPATIENT (OUTPATIENT)
Dept: OUTPATIENT SERVICES | Facility: HOSPITAL | Age: 74
LOS: 1 days | Discharge: HOME | End: 2019-03-08
Payer: SELF-PAY

## 2019-03-08 VITALS
RESPIRATION RATE: 17 BRPM | DIASTOLIC BLOOD PRESSURE: 66 MMHG | OXYGEN SATURATION: 95 % | SYSTOLIC BLOOD PRESSURE: 109 MMHG | HEIGHT: 64 IN | TEMPERATURE: 98 F | WEIGHT: 133.6 LBS | HEART RATE: 76 BPM

## 2019-03-08 VITALS
HEART RATE: 90 BPM | OXYGEN SATURATION: 100 % | RESPIRATION RATE: 15 BRPM | SYSTOLIC BLOOD PRESSURE: 133 MMHG | DIASTOLIC BLOOD PRESSURE: 77 MMHG

## 2019-03-08 DIAGNOSIS — H25.811 COMBINED FORMS OF AGE-RELATED CATARACT, RIGHT EYE: ICD-10-CM

## 2019-03-08 DIAGNOSIS — Z98.49 CATARACT EXTRACTION STATUS, UNSPECIFIED EYE: Chronic | ICD-10-CM

## 2019-03-08 DIAGNOSIS — Z86.79 PERSONAL HISTORY OF OTHER DISEASES OF THE CIRCULATORY SYSTEM: Chronic | ICD-10-CM

## 2019-03-08 DIAGNOSIS — Z90.49 ACQUIRED ABSENCE OF OTHER SPECIFIED PARTS OF DIGESTIVE TRACT: Chronic | ICD-10-CM

## 2019-03-08 PROCEDURE — V2632: CPT

## 2019-03-08 PROCEDURE — 66984 XCAPSL CTRC RMVL W/O ECP: CPT | Mod: RT

## 2019-03-08 NOTE — ASU DISCHARGE PLAN (ADULT/PEDIATRIC) - ASU DC SPECIAL INSTRUCTIONSFT
No strenuous physical activity  Leave patch on  Do not wet patch  Follow up tomorrow 3/9/19 at 9:30AM at [address] [facility], [address], [place] [address]    Phone [phone]

## 2019-03-08 NOTE — ASU DISCHARGE PLAN (ADULT/PEDIATRIC) - CALL YOUR DOCTOR IF YOU HAVE ANY OF THE FOLLOWING:
Swelling that gets worse/Fever greater than (need to indicate Fahrenheit or Celsius)/Pain not relieved by Medications/Nausea and vomiting that does not stop/Bleeding that does not stop

## 2019-03-08 NOTE — ASU DISCHARGE PLAN (ADULT/PEDIATRIC) - CARE PROVIDER_API CALL
Deyanira Miller (MD; MPH)  Ophthalmology Surgery  [address], [address] [place] [address]  Phone: [phone]  Fax: [phone]  Follow Up Time:

## 2019-03-08 NOTE — ASU PATIENT PROFILE, ADULT - PSH
History of appendectomy    History of varicose veins    Status post cataract extraction  8/7/2018 left eye done

## 2019-03-12 ENCOUNTER — TRANSCRIPTION ENCOUNTER (OUTPATIENT)
Age: 74
End: 2019-03-12

## 2019-03-14 ENCOUNTER — APPOINTMENT (OUTPATIENT)
Dept: INTERNAL MEDICINE | Facility: CLINIC | Age: 74
End: 2019-03-14

## 2019-03-14 ENCOUNTER — RESULT CHARGE (OUTPATIENT)
Age: 74
End: 2019-03-14

## 2019-03-14 ENCOUNTER — OUTPATIENT (OUTPATIENT)
Dept: OUTPATIENT SERVICES | Facility: HOSPITAL | Age: 74
LOS: 1 days | End: 2019-03-14
Payer: SELF-PAY

## 2019-03-14 ENCOUNTER — APPOINTMENT (OUTPATIENT)
Dept: OPHTHALMOLOGY | Facility: CLINIC | Age: 74
End: 2019-03-14

## 2019-03-14 ENCOUNTER — TRANSCRIPTION ENCOUNTER (OUTPATIENT)
Age: 74
End: 2019-03-14

## 2019-03-14 DIAGNOSIS — L91.8 OTHER HYPERTROPHIC DISORDERS OF THE SKIN: ICD-10-CM

## 2019-03-14 DIAGNOSIS — Z79.01 LONG TERM (CURRENT) USE OF ANTICOAGULANTS: ICD-10-CM

## 2019-03-14 DIAGNOSIS — Z90.49 ACQUIRED ABSENCE OF OTHER SPECIFIED PARTS OF DIGESTIVE TRACT: Chronic | ICD-10-CM

## 2019-03-14 DIAGNOSIS — H26.8 OTHER SPECIFIED CATARACT: ICD-10-CM

## 2019-03-14 DIAGNOSIS — Z86.79 PERSONAL HISTORY OF OTHER DISEASES OF THE CIRCULATORY SYSTEM: Chronic | ICD-10-CM

## 2019-03-14 DIAGNOSIS — Z01.818 ENCOUNTER FOR OTHER PREPROCEDURAL EXAMINATION: ICD-10-CM

## 2019-03-14 DIAGNOSIS — Z98.49 CATARACT EXTRACTION STATUS, UNSPECIFIED EYE: Chronic | ICD-10-CM

## 2019-03-14 DIAGNOSIS — I48.91 UNSPECIFIED ATRIAL FIBRILLATION: ICD-10-CM

## 2019-03-14 LAB
INR PPP: 2.4 RATIO
POCT-PROTHROMBIN TIME: 28.9 SECS
QUALITY CONTROL: YES

## 2019-03-14 PROCEDURE — 85610 PROTHROMBIN TIME: CPT

## 2019-03-15 ENCOUNTER — OUTPATIENT (OUTPATIENT)
Dept: OUTPATIENT SERVICES | Facility: HOSPITAL | Age: 74
LOS: 1 days | Discharge: HOME | End: 2019-03-15
Payer: SELF-PAY

## 2019-03-15 VITALS
HEIGHT: 64.5 IN | HEART RATE: 86 BPM | SYSTOLIC BLOOD PRESSURE: 107 MMHG | RESPIRATION RATE: 18 BRPM | WEIGHT: 134.92 LBS | OXYGEN SATURATION: 96 % | DIASTOLIC BLOOD PRESSURE: 67 MMHG | TEMPERATURE: 99 F

## 2019-03-15 VITALS
DIASTOLIC BLOOD PRESSURE: 73 MMHG | SYSTOLIC BLOOD PRESSURE: 145 MMHG | OXYGEN SATURATION: 100 % | HEART RATE: 81 BPM | RESPIRATION RATE: 15 BRPM

## 2019-03-15 DIAGNOSIS — Z98.49 CATARACT EXTRACTION STATUS, UNSPECIFIED EYE: Chronic | ICD-10-CM

## 2019-03-15 DIAGNOSIS — H59.021 CATARACT (LENS) FRAGMENTS IN EYE FOLLOWING CATARACT SURGERY, RIGHT EYE: ICD-10-CM

## 2019-03-15 DIAGNOSIS — Z86.79 PERSONAL HISTORY OF OTHER DISEASES OF THE CIRCULATORY SYSTEM: Chronic | ICD-10-CM

## 2019-03-15 DIAGNOSIS — Z90.49 ACQUIRED ABSENCE OF OTHER SPECIFIED PARTS OF DIGESTIVE TRACT: Chronic | ICD-10-CM

## 2019-03-15 PROCEDURE — 66840 REMOVAL OF LENS MATERIAL: CPT | Mod: RT

## 2019-03-15 NOTE — ASU DISCHARGE PLAN (ADULT/PEDIATRIC) - CARE COORDINATION DISCHARGE PLANNING
Results for orders placed or performed in visit on 05/02/17   AMB POC URINALYSIS DIP STICK AUTO W/O MICRO   Result Value Ref Range    Color (UA POC) Yellow     Clarity (UA POC) Clear     Glucose (UA POC) Negative Negative    Bilirubin (UA POC) Negative Negative    Ketones (UA POC) 2+ Negative    Specific gravity (UA POC) 1.030 1.001 - 1.035    Blood (UA POC) 2+ Negative    pH (UA POC) 6.5 4.6 - 8.0    Protein (UA POC) 3+ Negative mg/dL    Urobilinogen (UA POC) 0.2 mg/dL 0.2 - 1    Nitrites (UA POC) Negative Negative    Leukocyte esterase (UA POC) 1+ Negative No

## 2019-03-15 NOTE — ASU DISCHARGE PLAN (ADULT/PEDIATRIC) - ASU DC SPECIAL INSTRUCTIONSFT
Leave on patch  Do not wet patch  No strenuous physical activity  Follow up tomorrow at 9AM at [address] [facility], [address], phone [phone]

## 2019-03-15 NOTE — ASU DISCHARGE PLAN (ADULT/PEDIATRIC) - PAIN MANAGEMENT
Tylenol only for discomfort. No Motrin , Ibuprofen, Advil or Aleve./Take over the counter pain medication

## 2019-03-15 NOTE — ASU PATIENT PROFILE, ADULT - PSH
History of appendectomy    History of varicose veins    Status post cataract extraction  8/7/2018 left eye done  March 8 2019 Right eye

## 2019-03-15 NOTE — ASU DISCHARGE PLAN (ADULT/PEDIATRIC) - CALL YOUR DOCTOR IF YOU HAVE ANY OF THE FOLLOWING:
Pain not relieved by Medications/Numbness, tingling, color or temperature change to extremity/Increased irritability or sluggishness/Swelling that gets worse/Fever greater than (need to indicate Fahrenheit or Celsius)/Wound/Surgical Site with redness, or foul smelling discharge or pus/Bleeding that does not stop/Nausea and vomiting that does not stop

## 2019-03-15 NOTE — ASU DISCHARGE PLAN (ADULT/PEDIATRIC) - CARE PROVIDER_API CALL
Deyanira Miller; MPH)  Ophthalmology  [address] [place] [address]  Phone: [phone]  Fax: [phone]  Follow Up Time:

## 2019-03-21 ENCOUNTER — APPOINTMENT (OUTPATIENT)
Dept: OPHTHALMOLOGY | Facility: CLINIC | Age: 74
End: 2019-03-21

## 2019-03-27 ENCOUNTER — RESULT CHARGE (OUTPATIENT)
Age: 74
End: 2019-03-27

## 2019-03-28 ENCOUNTER — APPOINTMENT (OUTPATIENT)
Dept: OPHTHALMOLOGY | Facility: CLINIC | Age: 74
End: 2019-03-28

## 2019-03-28 ENCOUNTER — OUTPATIENT (OUTPATIENT)
Dept: OUTPATIENT SERVICES | Facility: HOSPITAL | Age: 74
LOS: 1 days | End: 2019-03-28
Payer: SELF-PAY

## 2019-03-28 ENCOUNTER — APPOINTMENT (OUTPATIENT)
Dept: INTERNAL MEDICINE | Facility: CLINIC | Age: 74
End: 2019-03-28

## 2019-03-28 DIAGNOSIS — I48.91 UNSPECIFIED ATRIAL FIBRILLATION: ICD-10-CM

## 2019-03-28 DIAGNOSIS — Z90.49 ACQUIRED ABSENCE OF OTHER SPECIFIED PARTS OF DIGESTIVE TRACT: Chronic | ICD-10-CM

## 2019-03-28 DIAGNOSIS — Z98.49 CATARACT EXTRACTION STATUS, UNSPECIFIED EYE: Chronic | ICD-10-CM

## 2019-03-28 DIAGNOSIS — Z79.01 LONG TERM (CURRENT) USE OF ANTICOAGULANTS: ICD-10-CM

## 2019-03-28 DIAGNOSIS — Z86.79 PERSONAL HISTORY OF OTHER DISEASES OF THE CIRCULATORY SYSTEM: Chronic | ICD-10-CM

## 2019-03-28 DIAGNOSIS — Z01.818 ENCOUNTER FOR OTHER PREPROCEDURAL EXAMINATION: ICD-10-CM

## 2019-03-28 DIAGNOSIS — L91.8 OTHER HYPERTROPHIC DISORDERS OF THE SKIN: ICD-10-CM

## 2019-03-28 LAB
INR PPP: 2.4 RATIO
POCT-PROTHROMBIN TIME: 24.9 SECS
QUALITY CONTROL: YES

## 2019-03-28 PROCEDURE — 85610 PROTHROMBIN TIME: CPT

## 2019-04-17 ENCOUNTER — APPOINTMENT (OUTPATIENT)
Dept: INTERNAL MEDICINE | Facility: CLINIC | Age: 74
End: 2019-04-17

## 2019-04-17 ENCOUNTER — RESULT CHARGE (OUTPATIENT)
Age: 74
End: 2019-04-17

## 2019-04-17 ENCOUNTER — APPOINTMENT (OUTPATIENT)
Dept: CARDIOLOGY | Facility: HOSPITAL | Age: 74
End: 2019-04-17

## 2019-04-17 ENCOUNTER — OUTPATIENT (OUTPATIENT)
Dept: OUTPATIENT SERVICES | Facility: HOSPITAL | Age: 74
LOS: 1 days | End: 2019-04-17
Payer: SELF-PAY

## 2019-04-17 VITALS
WEIGHT: 146 LBS | HEART RATE: 89 BPM | BODY MASS INDEX: 24.92 KG/M2 | HEIGHT: 64 IN | SYSTOLIC BLOOD PRESSURE: 116 MMHG | OXYGEN SATURATION: 96 % | DIASTOLIC BLOOD PRESSURE: 75 MMHG

## 2019-04-17 DIAGNOSIS — Z86.79 PERSONAL HISTORY OF OTHER DISEASES OF THE CIRCULATORY SYSTEM: Chronic | ICD-10-CM

## 2019-04-17 DIAGNOSIS — Z79.01 LONG TERM (CURRENT) USE OF ANTICOAGULANTS: ICD-10-CM

## 2019-04-17 DIAGNOSIS — I48.91 UNSPECIFIED ATRIAL FIBRILLATION: ICD-10-CM

## 2019-04-17 DIAGNOSIS — Z98.49 CATARACT EXTRACTION STATUS, UNSPECIFIED EYE: Chronic | ICD-10-CM

## 2019-04-17 DIAGNOSIS — Z90.49 ACQUIRED ABSENCE OF OTHER SPECIFIED PARTS OF DIGESTIVE TRACT: Chronic | ICD-10-CM

## 2019-04-17 DIAGNOSIS — Z01.818 ENCOUNTER FOR OTHER PREPROCEDURAL EXAMINATION: ICD-10-CM

## 2019-04-17 DIAGNOSIS — I25.10 ATHEROSCLEROTIC HEART DISEASE OF NATIVE CORONARY ARTERY WITHOUT ANGINA PECTORIS: ICD-10-CM

## 2019-04-17 DIAGNOSIS — L91.8 OTHER HYPERTROPHIC DISORDERS OF THE SKIN: ICD-10-CM

## 2019-04-17 LAB
INR PPP: 1.8 RATIO
POCT-PROTHROMBIN TIME: 21.1 SECS
QUALITY CONTROL: YES

## 2019-04-17 PROCEDURE — 85610 PROTHROMBIN TIME: CPT

## 2019-04-17 PROCEDURE — 93005 ELECTROCARDIOGRAM TRACING: CPT

## 2019-04-17 PROCEDURE — G0463: CPT

## 2019-04-17 NOTE — REVIEW OF SYSTEMS
[Muscle Cramps] : muscle cramps [Palpitations] : palpitations [Tingling (Paresthesia)] : tingling [Negative] : Gastrointestinal

## 2019-04-19 DIAGNOSIS — I63.9 CEREBRAL INFARCTION, UNSPECIFIED: ICD-10-CM

## 2019-04-19 DIAGNOSIS — R42 DIZZINESS AND GIDDINESS: ICD-10-CM

## 2019-04-19 DIAGNOSIS — I48.91 UNSPECIFIED ATRIAL FIBRILLATION: ICD-10-CM

## 2019-04-24 ENCOUNTER — APPOINTMENT (OUTPATIENT)
Dept: INTERNAL MEDICINE | Facility: CLINIC | Age: 74
End: 2019-04-24

## 2019-04-24 LAB
INR PPP: 1.9 RATIO
POCT-PROTHROMBIN TIME: 22.9 SECS
QUALITY CONTROL: YES

## 2019-04-29 ENCOUNTER — APPOINTMENT (OUTPATIENT)
Dept: OPHTHALMOLOGY | Facility: CLINIC | Age: 74
End: 2019-04-29

## 2019-04-29 ENCOUNTER — OUTPATIENT (OUTPATIENT)
Dept: OUTPATIENT SERVICES | Facility: HOSPITAL | Age: 74
LOS: 1 days | End: 2019-04-29
Payer: SELF-PAY

## 2019-04-29 ENCOUNTER — APPOINTMENT (OUTPATIENT)
Dept: INTERNAL MEDICINE | Facility: CLINIC | Age: 74
End: 2019-04-29

## 2019-04-29 ENCOUNTER — RESULT CHARGE (OUTPATIENT)
Age: 74
End: 2019-04-29

## 2019-04-29 DIAGNOSIS — Z79.01 LONG TERM (CURRENT) USE OF ANTICOAGULANTS: ICD-10-CM

## 2019-04-29 DIAGNOSIS — Z86.79 PERSONAL HISTORY OF OTHER DISEASES OF THE CIRCULATORY SYSTEM: Chronic | ICD-10-CM

## 2019-04-29 DIAGNOSIS — Z90.49 ACQUIRED ABSENCE OF OTHER SPECIFIED PARTS OF DIGESTIVE TRACT: Chronic | ICD-10-CM

## 2019-04-29 DIAGNOSIS — L91.8 OTHER HYPERTROPHIC DISORDERS OF THE SKIN: ICD-10-CM

## 2019-04-29 DIAGNOSIS — Z98.49 CATARACT EXTRACTION STATUS, UNSPECIFIED EYE: Chronic | ICD-10-CM

## 2019-04-29 DIAGNOSIS — I48.91 UNSPECIFIED ATRIAL FIBRILLATION: ICD-10-CM

## 2019-04-29 DIAGNOSIS — Z01.818 ENCOUNTER FOR OTHER PREPROCEDURAL EXAMINATION: ICD-10-CM

## 2019-04-29 LAB
INR PPP: 3.2 RATIO
POCT-PROTHROMBIN TIME: 38.6 SECS
QUALITY CONTROL: YES

## 2019-04-29 PROCEDURE — 85610 PROTHROMBIN TIME: CPT

## 2019-05-02 NOTE — HISTORY OF PRESENT ILLNESS
[FreeTextEntry1] : 73 y/o woman w/ hx of R occipital CVA 11/2016, Afib (on coumadin; was unable to afford NOAC) s/p unsuccessful RACIEL/DCCV on 1/19/2017 (was on amio during RACIEL/DCCV but reverted back to afib in 1-2 days afterwards), also couldn't tolerate amio in the past due to concerns of iodine allergy/throat swelling while being on it.  Trialed on digoxin as well, however developed dyspnea and self-discontinued it.  \par \par Patient is here for follow up. \par \par Still w/ occasional lightheadedness/dizziness as well as worsening palpitations at night.  Endorsing 10 day hx of "mid-sternal" chest pressure; which also occurs at night.  However non-exertional, no dyspnea, or any other concerning.  No syncope, falls, orthopnea, PND or any other cardio-pulmonary sx's.  \par \par Attempted to take 25 mg of metoprolol, however felt "dizzy" and went back to her home dose.  \par \par No other new/acute issues addressed.  \par \par CARDIAC Hx \par \par TTE 9/2017\par 1. Calcified trileaflet aortic valve with normal opening.\par 2. Severely dilated left atrium. LA volume index = 50cc/m2.\par 3. Normal left ventricular internal dimensions and wall thicknesses.\par 4. Normal left ventricular systolic function.\par 5. Normal right ventricular size and function.\par 6. Normal tricuspid valve. Mild-moderate tricuspid regurgitation.\par 7. Estimated pulmonary artery systolic pressure equals 32mm Hg, assuming right atrial pressure equals 8 mm Hg,consistent with normal pulmonary pressures.\par 8. Thickened pericardium.\par *** Compared with echocardiogram of 1/19/2017, no significant changes noted.\par \par Holter  10/2018 - occasional episodes of tachy-eugenia syndrome (HR 50s-170s);  avg mostly ~ 80s \par Holter 10/2017 - Frequent afib with RVR episodes up to 180. \par \par Holter 6/2017 - Average HR 97. Frequent RVR episodes. No eugenia episodes. \par \par Pharmacologic NST 5/2017 - ?Small area of scar in apical wall. EF 63%. \par \par RACIEL 1/19/2017\par 1. MAC, Mild-moderate mitral regurgitation.\par 2. Smoke is seen in left atrial appendage without organized thrombus. Decreased left atrial appendage velocities noted.\par 3. Grossly normal left ventricular systolic function.\par 4. Normal right ventricular size and function.\par 5. Normal tricuspid valve. Moderate tricuspid regurgitation.\par 6. Estimated pulmonary artery systolic pressure equals 38mm Hg, assuming right atrial pressure equals 8 mm Hg,consistent with borderline pulmonary pressures.\par 7. Color flow Doppler demonstrate no evidence of a patentforamen ovale.\par \par TTE 11/15/2016 - EF 70 %\par 1. Normal left ventricular internal dimensions and wall thicknesses.\par 2. Hyperdynamic left ventricular systolic function. No segmental wall motion abnormalities.\par 3. Normal right ventricular size and function.\par 4. Estimated pulmonary artery systolic pressure equals 30mm Hg, assuming right atrial pressure equals 8 mm Hg, consistent with normal pulmonary pressures

## 2019-05-02 NOTE — PHYSICAL EXAM
[General Appearance - Well Nourished] : well nourished [General Appearance - Well Developed] : well developed [Normal Conjunctiva] : the conjunctiva exhibited no abnormalities [Normal Oral Mucosa] : normal oral mucosa [Normal Jugular Venous V Waves Present] : normal jugular venous V waves present [Auscultation Breath Sounds / Voice Sounds] : lungs were clear to auscultation bilaterally [Respiration, Rhythm And Depth] : normal respiratory rhythm and effort [Heart Sounds] : normal S1 and S2 [Heart Rate And Rhythm] : heart rate and rhythm were normal [Murmurs] : no murmurs present [Arterial Pulses Normal] : the arterial pulses were normal [Bowel Sounds] : normal bowel sounds [Abdomen Soft] : soft [Abdomen Tenderness] : non-tender [] : no hepato-splenomegaly [Abnormal Walk] : normal gait [Nail Clubbing] : no clubbing of the fingernails [Skin Color & Pigmentation] : normal skin color and pigmentation [Skin Turgor] : normal skin turgor [Oriented To Time, Place, And Person] : oriented to person, place, and time [FreeTextEntry1] : chest wall tenderness

## 2019-05-02 NOTE — DISCUSSION/SUMMARY
[FreeTextEntry1] : 73 y/o woman w/ hx of R occipital CVA 11/2016, Afib (on coumadin) s/p RACIEL/CV on amio 1/19/2017/unsuccessful), non-obstructive CAD, here for follow up. \par Endorsing atypical chest pain, which is musculoskeletal in nature.  Last nuc stress test was on 10/2017 which was essentially normal\par Occasional palpitations/dizziness, which mainly occur at night; but non-exertional sx's.  \par \par \par RECS\par - cont toprol XL 12.5 mg daily and rest of meds (dilt  mg daily, coumadin, statin) and rest of meds \par - cont compression stockings \par \par Friend (contact info), Jodi Wright present during every visit. Contact info: [phone] (home), [phone] (cell phone) \par \par RTC in 6 months. Sooner PRN.

## 2019-05-06 ENCOUNTER — APPOINTMENT (OUTPATIENT)
Dept: INTERNAL MEDICINE | Facility: CLINIC | Age: 74
End: 2019-05-06

## 2019-05-06 LAB
INR PPP: 2.7 RATIO
POCT-PROTHROMBIN TIME: 32.7 SECS
QUALITY CONTROL: YES

## 2019-05-13 ENCOUNTER — RESULT CHARGE (OUTPATIENT)
Age: 74
End: 2019-05-13

## 2019-05-13 ENCOUNTER — APPOINTMENT (OUTPATIENT)
Dept: INTERNAL MEDICINE | Facility: CLINIC | Age: 74
End: 2019-05-13

## 2019-05-13 ENCOUNTER — OUTPATIENT (OUTPATIENT)
Dept: OUTPATIENT SERVICES | Facility: HOSPITAL | Age: 74
LOS: 1 days | End: 2019-05-13
Payer: SELF-PAY

## 2019-05-13 DIAGNOSIS — Z98.49 CATARACT EXTRACTION STATUS, UNSPECIFIED EYE: Chronic | ICD-10-CM

## 2019-05-13 DIAGNOSIS — L91.8 OTHER HYPERTROPHIC DISORDERS OF THE SKIN: ICD-10-CM

## 2019-05-13 DIAGNOSIS — Z79.01 LONG TERM (CURRENT) USE OF ANTICOAGULANTS: ICD-10-CM

## 2019-05-13 DIAGNOSIS — Z01.818 ENCOUNTER FOR OTHER PREPROCEDURAL EXAMINATION: ICD-10-CM

## 2019-05-13 DIAGNOSIS — Z86.79 PERSONAL HISTORY OF OTHER DISEASES OF THE CIRCULATORY SYSTEM: Chronic | ICD-10-CM

## 2019-05-13 DIAGNOSIS — I48.91 UNSPECIFIED ATRIAL FIBRILLATION: ICD-10-CM

## 2019-05-13 DIAGNOSIS — Z90.49 ACQUIRED ABSENCE OF OTHER SPECIFIED PARTS OF DIGESTIVE TRACT: Chronic | ICD-10-CM

## 2019-05-13 LAB
INR PPP: 2.8 RATIO
POCT-PROTHROMBIN TIME: 33.3 SECS
QUALITY CONTROL: YES

## 2019-05-13 PROCEDURE — 85610 PROTHROMBIN TIME: CPT

## 2019-05-20 ENCOUNTER — OUTPATIENT (OUTPATIENT)
Dept: OUTPATIENT SERVICES | Facility: HOSPITAL | Age: 74
LOS: 1 days | End: 2019-05-20
Payer: SELF-PAY

## 2019-05-20 ENCOUNTER — RESULT CHARGE (OUTPATIENT)
Age: 74
End: 2019-05-20

## 2019-05-20 ENCOUNTER — APPOINTMENT (OUTPATIENT)
Dept: INTERNAL MEDICINE | Facility: CLINIC | Age: 74
End: 2019-05-20

## 2019-05-20 DIAGNOSIS — Z90.49 ACQUIRED ABSENCE OF OTHER SPECIFIED PARTS OF DIGESTIVE TRACT: Chronic | ICD-10-CM

## 2019-05-20 DIAGNOSIS — Z98.49 CATARACT EXTRACTION STATUS, UNSPECIFIED EYE: Chronic | ICD-10-CM

## 2019-05-20 DIAGNOSIS — Z86.79 PERSONAL HISTORY OF OTHER DISEASES OF THE CIRCULATORY SYSTEM: Chronic | ICD-10-CM

## 2019-05-20 LAB
INR PPP: 2.7 RATIO
POCT-PROTHROMBIN TIME: 32.8 SECS
QUALITY CONTROL: YES

## 2019-05-20 PROCEDURE — 85610 PROTHROMBIN TIME: CPT

## 2019-05-21 DIAGNOSIS — L91.8 OTHER HYPERTROPHIC DISORDERS OF THE SKIN: ICD-10-CM

## 2019-05-21 DIAGNOSIS — Z01.818 ENCOUNTER FOR OTHER PREPROCEDURAL EXAMINATION: ICD-10-CM

## 2019-05-21 DIAGNOSIS — Z79.01 LONG TERM (CURRENT) USE OF ANTICOAGULANTS: ICD-10-CM

## 2019-05-21 DIAGNOSIS — I48.91 UNSPECIFIED ATRIAL FIBRILLATION: ICD-10-CM

## 2019-06-05 ENCOUNTER — APPOINTMENT (OUTPATIENT)
Dept: INTERNAL MEDICINE | Facility: CLINIC | Age: 74
End: 2019-06-05

## 2019-06-05 ENCOUNTER — RESULT CHARGE (OUTPATIENT)
Age: 74
End: 2019-06-05

## 2019-06-05 ENCOUNTER — RX RENEWAL (OUTPATIENT)
Age: 74
End: 2019-06-05

## 2019-06-05 LAB
INR PPP: 2.2 RATIO
POCT-PROTHROMBIN TIME: 26.2 SECS
QUALITY CONTROL: YES

## 2019-06-19 ENCOUNTER — APPOINTMENT (OUTPATIENT)
Dept: OPHTHALMOLOGY | Facility: CLINIC | Age: 74
End: 2019-06-19

## 2019-06-19 ENCOUNTER — APPOINTMENT (OUTPATIENT)
Dept: INTERNAL MEDICINE | Facility: CLINIC | Age: 74
End: 2019-06-19

## 2019-06-19 ENCOUNTER — OUTPATIENT (OUTPATIENT)
Dept: OUTPATIENT SERVICES | Facility: HOSPITAL | Age: 74
LOS: 1 days | End: 2019-06-19
Payer: SELF-PAY

## 2019-06-19 ENCOUNTER — RESULT CHARGE (OUTPATIENT)
Age: 74
End: 2019-06-19

## 2019-06-19 DIAGNOSIS — Z01.818 ENCOUNTER FOR OTHER PREPROCEDURAL EXAMINATION: ICD-10-CM

## 2019-06-19 DIAGNOSIS — I48.91 UNSPECIFIED ATRIAL FIBRILLATION: ICD-10-CM

## 2019-06-19 DIAGNOSIS — Z79.01 LONG TERM (CURRENT) USE OF ANTICOAGULANTS: ICD-10-CM

## 2019-06-19 DIAGNOSIS — L91.8 OTHER HYPERTROPHIC DISORDERS OF THE SKIN: ICD-10-CM

## 2019-06-19 DIAGNOSIS — Z86.79 PERSONAL HISTORY OF OTHER DISEASES OF THE CIRCULATORY SYSTEM: Chronic | ICD-10-CM

## 2019-06-19 DIAGNOSIS — Z90.49 ACQUIRED ABSENCE OF OTHER SPECIFIED PARTS OF DIGESTIVE TRACT: Chronic | ICD-10-CM

## 2019-06-19 DIAGNOSIS — Z98.49 CATARACT EXTRACTION STATUS, UNSPECIFIED EYE: Chronic | ICD-10-CM

## 2019-06-19 LAB
INR PPP: 2.2 RATIO
POCT-PROTHROMBIN TIME: 26 SECS
QUALITY CONTROL: YES

## 2019-06-19 PROCEDURE — 85610 PROTHROMBIN TIME: CPT

## 2019-06-20 ENCOUNTER — APPOINTMENT (OUTPATIENT)
Dept: OPHTHALMOLOGY | Facility: CLINIC | Age: 74
End: 2019-06-20

## 2019-06-20 ENCOUNTER — RX RENEWAL (OUTPATIENT)
Age: 74
End: 2019-06-20

## 2019-06-26 ENCOUNTER — APPOINTMENT (OUTPATIENT)
Dept: OPHTHALMOLOGY | Facility: CLINIC | Age: 74
End: 2019-06-26

## 2019-06-27 ENCOUNTER — LABORATORY RESULT (OUTPATIENT)
Age: 74
End: 2019-06-27

## 2019-06-27 ENCOUNTER — APPOINTMENT (OUTPATIENT)
Dept: INTERNAL MEDICINE | Facility: CLINIC | Age: 74
End: 2019-06-27

## 2019-06-27 ENCOUNTER — OUTPATIENT (OUTPATIENT)
Dept: OUTPATIENT SERVICES | Facility: HOSPITAL | Age: 74
LOS: 1 days | End: 2019-06-27
Payer: SELF-PAY

## 2019-06-27 VITALS
HEART RATE: 82 BPM | SYSTOLIC BLOOD PRESSURE: 138 MMHG | WEIGHT: 135 LBS | OXYGEN SATURATION: 99 % | DIASTOLIC BLOOD PRESSURE: 82 MMHG | BODY MASS INDEX: 23.17 KG/M2

## 2019-06-27 DIAGNOSIS — I48.91 UNSPECIFIED ATRIAL FIBRILLATION: ICD-10-CM

## 2019-06-27 DIAGNOSIS — Z86.79 PERSONAL HISTORY OF OTHER DISEASES OF THE CIRCULATORY SYSTEM: Chronic | ICD-10-CM

## 2019-06-27 DIAGNOSIS — I10 ESSENTIAL (PRIMARY) HYPERTENSION: ICD-10-CM

## 2019-06-27 DIAGNOSIS — Z00.00 ENCOUNTER FOR GENERAL ADULT MEDICAL EXAMINATION W/OUT ABNORMAL FINDINGS: ICD-10-CM

## 2019-06-27 DIAGNOSIS — Z90.49 ACQUIRED ABSENCE OF OTHER SPECIFIED PARTS OF DIGESTIVE TRACT: Chronic | ICD-10-CM

## 2019-06-27 DIAGNOSIS — Z98.49 CATARACT EXTRACTION STATUS, UNSPECIFIED EYE: Chronic | ICD-10-CM

## 2019-06-27 DIAGNOSIS — Z01.818 ENCOUNTER FOR OTHER PREPROCEDURAL EXAMINATION: ICD-10-CM

## 2019-06-27 DIAGNOSIS — R00.2 PALPITATIONS: ICD-10-CM

## 2019-06-27 DIAGNOSIS — Z86.73 PERSONAL HISTORY OF TRANSIENT ISCHEMIC ATTACK (TIA), AND CEREBRAL INFARCTION W/OUT RESIDUAL DEFICITS: ICD-10-CM

## 2019-06-27 PROCEDURE — 93005 ELECTROCARDIOGRAM TRACING: CPT

## 2019-06-27 PROCEDURE — 84439 ASSAY OF FREE THYROXINE: CPT

## 2019-06-27 PROCEDURE — 80053 COMPREHEN METABOLIC PANEL: CPT

## 2019-06-27 PROCEDURE — 36415 COLL VENOUS BLD VENIPUNCTURE: CPT

## 2019-06-27 PROCEDURE — 80061 LIPID PANEL: CPT

## 2019-06-27 PROCEDURE — 85610 PROTHROMBIN TIME: CPT

## 2019-06-27 PROCEDURE — G0463: CPT

## 2019-06-27 PROCEDURE — 83036 HEMOGLOBIN GLYCOSYLATED A1C: CPT

## 2019-06-27 PROCEDURE — 85027 COMPLETE CBC AUTOMATED: CPT

## 2019-06-27 PROCEDURE — 84443 ASSAY THYROID STIM HORMONE: CPT

## 2019-06-27 RX ORDER — ATORVASTATIN CALCIUM 20 MG/1
20 TABLET, FILM COATED ORAL
Qty: 90 | Refills: 3 | Status: ACTIVE | COMMUNITY
Start: 2017-06-28 | End: 1900-01-01

## 2019-06-27 RX ORDER — WARFARIN 3 MG/1
3 TABLET ORAL
Qty: 90 | Refills: 3 | Status: ACTIVE | COMMUNITY
Start: 2018-05-09 | End: 1900-01-01

## 2019-06-27 RX ORDER — METOPROLOL SUCCINATE 25 MG/1
25 TABLET, EXTENDED RELEASE ORAL DAILY
Qty: 90 | Refills: 3 | Status: ACTIVE | COMMUNITY
Start: 2017-11-08 | End: 1900-01-01

## 2019-06-27 RX ORDER — DILTIAZEM HYDROCHLORIDE 180 MG/1
180 CAPSULE, EXTENDED RELEASE ORAL DAILY
Qty: 90 | Refills: 3 | Status: ACTIVE | COMMUNITY
Start: 2017-03-22 | End: 1900-01-01

## 2019-06-28 LAB
ALBUMIN SERPL ELPH-MCNC: 4.3 G/DL — SIGNIFICANT CHANGE UP (ref 3.3–5)
ALP SERPL-CCNC: 86 U/L — SIGNIFICANT CHANGE UP (ref 40–120)
ALT FLD-CCNC: 42 U/L — SIGNIFICANT CHANGE UP (ref 10–45)
ANION GAP SERPL CALC-SCNC: 12 MMOL/L — SIGNIFICANT CHANGE UP (ref 5–17)
AST SERPL-CCNC: 45 U/L — HIGH (ref 10–40)
BILIRUB SERPL-MCNC: 0.3 MG/DL — SIGNIFICANT CHANGE UP (ref 0.2–1.2)
BUN SERPL-MCNC: 15 MG/DL — SIGNIFICANT CHANGE UP (ref 7–23)
CALCIUM SERPL-MCNC: 9.1 MG/DL — SIGNIFICANT CHANGE UP (ref 8.4–10.5)
CHLORIDE SERPL-SCNC: 106 MMOL/L — SIGNIFICANT CHANGE UP (ref 96–108)
CHOLEST SERPL-MCNC: 133 MG/DL — SIGNIFICANT CHANGE UP (ref 10–199)
CO2 SERPL-SCNC: 26 MMOL/L — SIGNIFICANT CHANGE UP (ref 22–31)
CREAT SERPL-MCNC: 0.55 MG/DL — SIGNIFICANT CHANGE UP (ref 0.5–1.3)
ESTIMATED AVERAGE GLUCOSE: 114 MG/DL — SIGNIFICANT CHANGE UP (ref 68–114)
GLUCOSE SERPL-MCNC: 95 MG/DL — SIGNIFICANT CHANGE UP (ref 70–99)
HBA1C BLD-MCNC: 5.6 % — SIGNIFICANT CHANGE UP (ref 4–5.6)
HCT VFR BLD CALC: 42.1 % — SIGNIFICANT CHANGE UP (ref 34.5–45)
HDLC SERPL-MCNC: 67 MG/DL — SIGNIFICANT CHANGE UP
HGB BLD-MCNC: 13 G/DL — SIGNIFICANT CHANGE UP (ref 11.5–15.5)
INR PPP: 2.6 RATIO
LIPID PNL WITH DIRECT LDL SERPL: 56 MG/DL — SIGNIFICANT CHANGE UP
MCHC RBC-ENTMCNC: 29 PG — SIGNIFICANT CHANGE UP (ref 27–34)
MCHC RBC-ENTMCNC: 30.9 GM/DL — LOW (ref 32–36)
MCV RBC AUTO: 94 FL — SIGNIFICANT CHANGE UP (ref 80–100)
PLATELET # BLD AUTO: 236 K/UL — SIGNIFICANT CHANGE UP (ref 150–400)
POCT-PROTHROMBIN TIME: 31.4 SECS
POTASSIUM SERPL-MCNC: 4.4 MMOL/L — SIGNIFICANT CHANGE UP (ref 3.5–5.3)
POTASSIUM SERPL-SCNC: 4.4 MMOL/L — SIGNIFICANT CHANGE UP (ref 3.5–5.3)
PROT SERPL-MCNC: 6.6 G/DL — SIGNIFICANT CHANGE UP (ref 6–8.3)
QUALITY CONTROL: YES
RBC # BLD: 4.48 M/UL — SIGNIFICANT CHANGE UP (ref 3.8–5.2)
RBC # FLD: 13.4 % — SIGNIFICANT CHANGE UP (ref 10.3–14.5)
SODIUM SERPL-SCNC: 144 MMOL/L — SIGNIFICANT CHANGE UP (ref 135–145)
T4 FREE SERPL-MCNC: 1.1 NG/DL — SIGNIFICANT CHANGE UP (ref 0.9–1.8)
T4 FREE+ TSH PNL SERPL: 6.72 UIU/ML — HIGH (ref 0.27–4.2)
TOTAL CHOLESTEROL/HDL RATIO MEASUREMENT: 2 RATIO — LOW (ref 3.3–7.1)
TRIGL SERPL-MCNC: 48 MG/DL — SIGNIFICANT CHANGE UP (ref 10–149)
WBC # BLD: 5.55 K/UL — SIGNIFICANT CHANGE UP (ref 3.8–10.5)
WBC # FLD AUTO: 5.55 K/UL — SIGNIFICANT CHANGE UP (ref 3.8–10.5)

## 2019-06-28 NOTE — HISTORY OF PRESENT ILLNESS
[Atrial Fibrillation] : atrial fibrillation [Coronary Artery Disease] : coronary artery disease [No Pertinent Pulmonary History] : no history of asthma, COPD, sleep apnea, or smoking [No Adverse Anesthesia Reaction] : no adverse anesthesia reaction in self or family member [Family Member] : family member with adverse anesthesia reaction/sudden death [Excellent (>10 METs)] : Excellent (>10 METs) [Anticoagulants: _____] : Anticoagulants: [unfilled] [Aortic Stenosis] : no aortic stenosis [Recent Myocardial Infarction] : no recent myocardial infarction [Asthma] : no asthma [Implantable Device/Pacemaker] : no implantable device/pacemaker [COPD] : no COPD [Sleep Apnea] : no sleep apnea [Smoker] : not a smoker [Self] : no previous adverse anesthesia reaction [FreeTextEntry5] : patient's daughter experienced anaphylactic shock after dental anesthesia (happened in [facility]).  [FreeTextEntry4] : [id number]- [employer], [employer] \par \par R. eye: going to remove "scars on retina". \par Surgical date: 2-3 weeks, per ophthalmology. \par \par Has history afib on coumadin. s/p RACIEL/CV on amiodarone (unsuccessful for rhythm control), and non-obstructive CAD.\par Currently occasionally has chest pain. She was walking fast this morning, and felt some chest pain sensation on the left side.  Happens  rarely. It lasted a few seconds (<1 minute). No shortness of breath. No diaphoresis, nausea or general malaise. \par \par Past surgeries: appendix, varicose vein procedure, and cataract surgery bilaterally. \par No problem with anesthesia. \par  [FreeTextEntry8] : Can walk up 2 flights up steps. Can walk up to 1-2 hours without stopping.  [FreeTextEntry6] : +chest pain, rarely.

## 2019-06-28 NOTE — ASSESSMENT
[Patient Optimized for Surgery] : Patient optimized for surgery [No Further Testing Recommended] : no further testing recommended [Continue anticoagulant treatment as is] : Continue current anticoagulant treatment [As per surgery] : as per surgery [Continue medications as is] : Continue current medications [FreeTextEntry2] : coumadin, as above [FreeTextEntry4] : Patient is optimized for surgery, will check labs today. EKG shows stable TWI's in V1 and III, Acceptable risk for a low risk procedure given cardiac history. RCRI Class II, Risk 0.9% major cardiac event (low risk).  \par -c/w coumadin for afib, therapeutic POCT INR today 2.6. Continue. \par -refill meds today including coumadin, diltiazem, atorvastatin, metoprolol\par \par Plan d/w Dr. Duran\par \par CHACORTA Siddiqi M.D. PGY-3\par \par

## 2019-07-03 ENCOUNTER — APPOINTMENT (OUTPATIENT)
Dept: INTERNAL MEDICINE | Facility: CLINIC | Age: 74
End: 2019-07-03

## 2019-07-11 ENCOUNTER — RESULT CHARGE (OUTPATIENT)
Age: 74
End: 2019-07-11

## 2019-07-11 ENCOUNTER — APPOINTMENT (OUTPATIENT)
Dept: INTERNAL MEDICINE | Facility: CLINIC | Age: 74
End: 2019-07-11
Payer: SELF-PAY

## 2019-07-11 ENCOUNTER — OUTPATIENT (OUTPATIENT)
Dept: OUTPATIENT SERVICES | Facility: HOSPITAL | Age: 74
LOS: 1 days | End: 2019-07-11
Payer: SELF-PAY

## 2019-07-11 DIAGNOSIS — Z86.73 PERSONAL HISTORY OF TRANSIENT ISCHEMIC ATTACK (TIA), AND CEREBRAL INFARCTION WITHOUT RESIDUAL DEFICITS: ICD-10-CM

## 2019-07-11 DIAGNOSIS — I48.91 UNSPECIFIED ATRIAL FIBRILLATION: ICD-10-CM

## 2019-07-11 DIAGNOSIS — R00.2 PALPITATIONS: ICD-10-CM

## 2019-07-11 DIAGNOSIS — Z01.818 ENCOUNTER FOR OTHER PREPROCEDURAL EXAMINATION: ICD-10-CM

## 2019-07-11 DIAGNOSIS — Z90.49 ACQUIRED ABSENCE OF OTHER SPECIFIED PARTS OF DIGESTIVE TRACT: Chronic | ICD-10-CM

## 2019-07-11 DIAGNOSIS — Z86.79 PERSONAL HISTORY OF OTHER DISEASES OF THE CIRCULATORY SYSTEM: Chronic | ICD-10-CM

## 2019-07-11 DIAGNOSIS — Z98.49 CATARACT EXTRACTION STATUS, UNSPECIFIED EYE: Chronic | ICD-10-CM

## 2019-07-11 DIAGNOSIS — I10 ESSENTIAL (PRIMARY) HYPERTENSION: ICD-10-CM

## 2019-07-11 DIAGNOSIS — Z79.01 LONG TERM (CURRENT) USE OF ANTICOAGULANTS: ICD-10-CM

## 2019-07-11 LAB
INR PPP: 3.1 RATIO
POCT-PROTHROMBIN TIME: 37.1 SECS
QUALITY CONTROL: YES

## 2019-07-11 PROCEDURE — ZZZZZ: CPT

## 2019-07-11 PROCEDURE — 85610 PROTHROMBIN TIME: CPT

## 2019-07-18 ENCOUNTER — RESULT CHARGE (OUTPATIENT)
Age: 74
End: 2019-07-18

## 2019-07-18 ENCOUNTER — OUTPATIENT (OUTPATIENT)
Dept: OUTPATIENT SERVICES | Facility: HOSPITAL | Age: 74
LOS: 1 days | End: 2019-07-18
Payer: SELF-PAY

## 2019-07-18 ENCOUNTER — APPOINTMENT (OUTPATIENT)
Dept: INTERNAL MEDICINE | Facility: CLINIC | Age: 74
End: 2019-07-18

## 2019-07-18 DIAGNOSIS — Z90.49 ACQUIRED ABSENCE OF OTHER SPECIFIED PARTS OF DIGESTIVE TRACT: Chronic | ICD-10-CM

## 2019-07-18 DIAGNOSIS — R00.2 PALPITATIONS: ICD-10-CM

## 2019-07-18 DIAGNOSIS — Z98.49 CATARACT EXTRACTION STATUS, UNSPECIFIED EYE: Chronic | ICD-10-CM

## 2019-07-18 DIAGNOSIS — Z01.818 ENCOUNTER FOR OTHER PREPROCEDURAL EXAMINATION: ICD-10-CM

## 2019-07-18 DIAGNOSIS — Z86.73 PERSONAL HISTORY OF TRANSIENT ISCHEMIC ATTACK (TIA), AND CEREBRAL INFARCTION WITHOUT RESIDUAL DEFICITS: ICD-10-CM

## 2019-07-18 DIAGNOSIS — Z86.79 PERSONAL HISTORY OF OTHER DISEASES OF THE CIRCULATORY SYSTEM: Chronic | ICD-10-CM

## 2019-07-18 DIAGNOSIS — Z79.01 LONG TERM (CURRENT) USE OF ANTICOAGULANTS: ICD-10-CM

## 2019-07-18 DIAGNOSIS — I48.91 UNSPECIFIED ATRIAL FIBRILLATION: ICD-10-CM

## 2019-07-18 DIAGNOSIS — I10 ESSENTIAL (PRIMARY) HYPERTENSION: ICD-10-CM

## 2019-07-18 LAB
INR PPP: 2.4 RATIO
POCT-PROTHROMBIN TIME: 28.2 SECS
QUALITY CONTROL: YES

## 2019-07-18 PROCEDURE — 85610 PROTHROMBIN TIME: CPT

## 2019-07-24 ENCOUNTER — RESULT CHARGE (OUTPATIENT)
Age: 74
End: 2019-07-24

## 2019-07-24 ENCOUNTER — OUTPATIENT (OUTPATIENT)
Dept: OUTPATIENT SERVICES | Facility: HOSPITAL | Age: 74
LOS: 1 days | End: 2019-07-24
Payer: SELF-PAY

## 2019-07-24 ENCOUNTER — APPOINTMENT (OUTPATIENT)
Dept: INTERNAL MEDICINE | Facility: CLINIC | Age: 74
End: 2019-07-24

## 2019-07-24 DIAGNOSIS — Z79.01 LONG TERM (CURRENT) USE OF ANTICOAGULANTS: ICD-10-CM

## 2019-07-24 DIAGNOSIS — I10 ESSENTIAL (PRIMARY) HYPERTENSION: ICD-10-CM

## 2019-07-24 DIAGNOSIS — R00.2 PALPITATIONS: ICD-10-CM

## 2019-07-24 DIAGNOSIS — Z86.79 PERSONAL HISTORY OF OTHER DISEASES OF THE CIRCULATORY SYSTEM: Chronic | ICD-10-CM

## 2019-07-24 DIAGNOSIS — Z98.49 CATARACT EXTRACTION STATUS, UNSPECIFIED EYE: Chronic | ICD-10-CM

## 2019-07-24 DIAGNOSIS — Z86.73 PERSONAL HISTORY OF TRANSIENT ISCHEMIC ATTACK (TIA), AND CEREBRAL INFARCTION WITHOUT RESIDUAL DEFICITS: ICD-10-CM

## 2019-07-24 DIAGNOSIS — Z01.818 ENCOUNTER FOR OTHER PREPROCEDURAL EXAMINATION: ICD-10-CM

## 2019-07-24 DIAGNOSIS — Z90.49 ACQUIRED ABSENCE OF OTHER SPECIFIED PARTS OF DIGESTIVE TRACT: Chronic | ICD-10-CM

## 2019-07-24 DIAGNOSIS — I48.91 UNSPECIFIED ATRIAL FIBRILLATION: ICD-10-CM

## 2019-07-24 LAB
INR PPP: 2.4 RATIO
POCT-PROTHROMBIN TIME: 28.7 SECS
QUALITY CONTROL: YES

## 2019-07-24 PROCEDURE — 85610 PROTHROMBIN TIME: CPT

## 2019-08-07 ENCOUNTER — APPOINTMENT (OUTPATIENT)
Dept: INTERNAL MEDICINE | Facility: CLINIC | Age: 74
End: 2019-08-07

## 2019-08-07 ENCOUNTER — RESULT CHARGE (OUTPATIENT)
Age: 74
End: 2019-08-07

## 2019-08-07 LAB
INR PPP: 2.4 RATIO
POCT-PROTHROMBIN TIME: 28.2 SECS
QUALITY CONTROL: YES

## 2019-08-08 ENCOUNTER — MESSAGE (OUTPATIENT)
Age: 74
End: 2019-08-08

## 2019-08-26 ENCOUNTER — MESSAGE (OUTPATIENT)
Age: 74
End: 2019-08-26

## 2019-08-28 ENCOUNTER — APPOINTMENT (OUTPATIENT)
Dept: OPHTHALMOLOGY | Facility: CLINIC | Age: 74
End: 2019-08-28

## 2019-10-16 ENCOUNTER — APPOINTMENT (OUTPATIENT)
Dept: CARDIOLOGY | Facility: HOSPITAL | Age: 74
End: 2019-10-16

## 2020-06-03 NOTE — ED ADULT TRIAGE NOTE - TEMPERATURE IN FAHRENHEIT (DEGREES F)
Quality 130: Documentation Of Current Medications In The Medical Record: Current Medications Documented Quality 111:Pneumonia Vaccination Status For Older Adults: Pneumococcal Vaccination not Administered or Previously Received, Reason not Otherwise Specified Quality 110: Preventive Care And Screening: Influenza Immunization: Influenza Immunization Administered during Influenza season Detail Level: Detailed 98.7

## 2020-09-25 NOTE — ASU PATIENT PROFILE, ADULT - PMH
Afib    CVA (cerebral vascular accident)    HLD (hyperlipidemia)    PNA (pneumonia)    Vision loss
[phone]

## 2021-02-08 NOTE — ASU PREOP CHECKLIST - AS BP NONINV SITE
left upper arm Paramedian Forehead Flap Text: A decision was made to reconstruct the defect utilizing an interpolation axial flap and a staged reconstruction.  A telfa template was made of the defect.  This telfa template was then used to outline the paramedian forehead pedicle flap.  The donor area for the pedicle flap was then injected with anesthesia.  The flap was excised through the skin and subcutaneous tissue down to the layer of the underlying musculature.  The pedicle flap was carefully excised within this deep plane to maintain its blood supply.  The edges of the donor site were undermined.   The donor site was closed in a primary fashion.  The pedicle was then rotated into position and sutured.  Once the tube was sutured into place, adequate blood supply was confirmed with blanching and refill.  The pedicle was then wrapped with xeroform gauze and dressed appropriately with a telfa and gauze bandage to ensure continued blood supply and protect the attached pedicle.

## 2022-05-09 NOTE — H&P CARDIOLOGY - RESPIRATORY
Breath Sounds equal & clear to percussion & auscultation, no accessory muscle use Carac Pregnancy And Lactation Text: This medication is Pregnancy Category X and contraindicated in pregnancy and in women who may become pregnant. It is unknown if this medication is excreted in breast milk.

## 2024-05-21 NOTE — H&P CARDIOLOGY - CONSTITUTIONAL
Well-developed, well nourished

## 2024-08-23 NOTE — ASU PATIENT PROFILE, ADULT - NS PRO TALK SOMEONE YN
Dear Aniket Kay MD,    I saw Lavern Allen on 8/23/2024 in my clinic in follow up visit.    HISTORY OF PRESENT ILLNESS:  Lavern Allen is a pleasant 80 year old female patient is here for follow-up visit. She was last seen on 2/20/2024. She is currently on Aricept 10 mg at nighttime. Today she is accompanied by her daughter. She moved to assisted living at the end of April. She is able to do activities of daily living on her own. She had 1 fall about 2 months ago without loss of consciousness. She denies visual hallucinations. She is tolerating Aricept well.    PAST MEDICAL HISTORY:    HTN (hypertension)                                            Breast cancer  (CMD)                                            Comment: Right breast status post lumpectomy followed by               mastectomy    Disorder of bone and cartilage, unspecified     06/03/2010    Lower back pain                                               Colonic polyp                                                 Osteoporosis                                                  Renal insufficiency                                           Dyslipidemia                                                  Past Surgical History:   Procedure Laterality Date    Ankle surgery      10 years ago    Breast lumpectomy  9/9/2011    Breast surgery      i&d ABSCESS    Breast surgery  2012    MASTECTOMY R    Colonoscopy diagnostic  5/23/2016    3 year    Colonoscopy w/ biopsies and polypectomy  4/2008    Dexa bone density axial skeleton  05/25/2010    Laminec/facetect/foramin,lumbar  1986    Laminectomy, Lumbar    Occult blood test tube  08/17/2011    Pap smear,routine  05/19/2010    Past surgical history  2003    R ankle surgery for fx    Past surgical history  1990    breast abcess I&D    Past surgical history      fibroid removed       Social History     Socioeconomic History    Marital status:      Spouse name: Not on file    Number of children: 2     Years of education: Not on file    Highest education level: Not on file   Occupational History    Occupation: Retired home health aide   Tobacco Use    Smoking status: Former     Current packs/day: 0.00     Average packs/day: 2.0 packs/day for 50.0 years (100.0 ttl pk-yrs)     Types: Cigarettes     Start date: 1967     Quit date: 2017     Years since quittin.8    Smokeless tobacco: Never    Tobacco comments:     smk'd up to 2 ppd prior to quitting, per pt   Vaping Use    Vaping status: never used   Substance and Sexual Activity    Alcohol use: No     Alcohol/week: 0.0 standard drinks of alcohol    Drug use: No    Sexual activity: Not on file   Other Topics Concern    Not on file   Social History Narrative    Not on file     Social Determinants of Health     Financial Resource Strain: Not on file   Food Insecurity: Not on file   Transportation Needs: Not on file   Physical Activity: Not on file   Stress: Not on file   Social Connections: Unknown (2024)    Received from [facility]    Social Connections     Social Connections: Last Flowsheet Data: Not on file     Social Connections: Recent Result: Not on file   Interpersonal Safety: Not on file (2022)       Family History   Problem Relation Age of Onset    Heart disease Mother     Stroke Father     Cancer Paternal Grandmother         UTERINE    Diabetes Neg Hx        ALLERGIES:   Allergen Reactions    Allergy      Hair dye.     Fosamax SWELLING     Ankle and facial swelling       Current Outpatient Medications   Medication Sig Dispense Refill    memantine (Namenda Titration Justus) tablet pack Use as directed. 49 tablet 0    donepezil (ARICEPT) 10 MG tablet Take 1 tablet by mouth nightly. 90 tablet 3    buPROPion XL (WELLBUTRIN XL) 150 MG 24 hr tablet Take 1 tablet by mouth daily. 90 tablet 1    lisinopril (ZESTRIL) 5 MG tablet Take 1 tablet by mouth daily. 90 tablet 1    apixaBAN (ELIQUIS) 5 MG Tab Take 1 tablet by  mouth every 12 hours. 180 tablet 1    sertraline (ZOLOFT) 50 MG tablet Take 1 tablet by mouth daily. 90 tablet 1    dilTIAZem (CARDIZEM CD) 240 MG 24 hr capsule Take 1 capsule by mouth daily. 90 capsule 1    furosemide (LASIX) 40 MG tablet Take 1 tablet by mouth daily. 90 tablet 1    metoPROLOL tartrate (LOPRESSOR) 25 MG tablet Take 1 tablet by mouth in the morning and 1 tablet before bedtime. 180 tablet 1    Acetaminophen 500 MG Cap Take 500 mg by mouth every 6 hours as needed (mild to moderate pain).      Cholecalciferol 25 mcg (1,000 units) capsule Take 25 mcg by mouth daily.      aspirin 81 MG chewable tablet Chew 81 mg by mouth daily.      Multiple Vitamins-Minerals (DAILY MULTI) TABS Take 1 tablet by mouth daily.  30 tablet      No current facility-administered medications for this visit.       GENERAL REVIEW OF SYSTEM:   As per history of present illness.    PHYSICAL EXAM:  The patient is well nourished and well developed, in no acute distress.  Blood pressure 130/68, pulse 76, height 5' 2\" (1.575 m), weight 64.5 kg (142 lb 4.9 oz), SpO2 92%.      NEUROLOGICAL EXAMINATION:  The patient is awake, alert, but disoriented to place and time. Immediate recall 2/3 despite multiple attempts, and delayed recall 0/3.  Attention span and fund of knowledge are impaired. The patient's speech is fluent and language is intact.    Pupils are 3 mm, round, reactive to light. Extraocular movements are normal. There is no nystagmus. Facial sensation and muscles of mastication are intact. Nasolabial folds are intact and symmetric with no evidence of facial weakness. Hearing is intact bilaterally. The uvula is midline, and palate moves symmetrically. Shoulder shrugging is normal and symmetric. Tongue is midline with no atrophy or fasciculation.   Muscle bulk and tone are normal. Strength showed weakness of left ankle dorsiflexion which is baseline. Deep tendon reflexes are symmetric in both upper and lower extremities. Plantars  are downgoing.   Coordination did not reveal dysmetria or dysdiadochokinesis. Gait examination is steppage gait.       Assessment and plan:  Maria Elena was seen today for neurologic problem.    Diagnoses and all orders for this visit:  Mixed Alzheimer's and vascular dementia  (CMD)  -     memantine (Namenda Titration Justus) tablet pack; Use as directed.    This is an 80-year-old female patient with mixed Alzheimer's and vascular dementia maintained on donepezil. The memory is getting worse and therefore we will consider adding Namenda to Aricept. I will start with the titration pack and later she needs to be on 10 mg twice daily. Follow-up in 6 months.    20 minutes were spent with the patient and in same day chart preparation including review of previous notes.    Thank you for the opportunity to continue to participate in the care of this patient.      Belinda Norris MD  August 23, 2024   no